# Patient Record
Sex: MALE | Race: WHITE | Employment: OTHER | URBAN - METROPOLITAN AREA
[De-identification: names, ages, dates, MRNs, and addresses within clinical notes are randomized per-mention and may not be internally consistent; named-entity substitution may affect disease eponyms.]

---

## 2017-01-04 DIAGNOSIS — Z96.659 S/P TOTAL KNEE ARTHROPLASTY: Primary | ICD-10-CM

## 2017-01-04 RX ORDER — AMOXICILLIN 500 MG/1
2000 CAPSULE ORAL ONCE
Qty: 12 CAPSULE | Refills: 0 | Status: SHIPPED | OUTPATIENT
Start: 2017-01-04 | End: 2017-01-04

## 2017-01-16 DIAGNOSIS — Z96.652 STATUS POST TOTAL LEFT KNEE REPLACEMENT: Primary | ICD-10-CM

## 2017-01-16 RX ORDER — AMOXICILLIN 500 MG/1
2000 TABLET, FILM COATED ORAL ONCE
Qty: 4 TABLET | Refills: 0 | Status: SHIPPED | OUTPATIENT
Start: 2017-01-16 | End: 2017-02-07

## 2017-01-16 NOTE — TELEPHONE ENCOUNTER
Shankar underwent left total knee replacement on 10/17/16.  Pt is calling to report dental issues, needing root canal and antibiotics beforehand.  Rx sent to his pharmacy, appt make in clinic for follow up with Maria Antonia in February.

## 2017-02-07 DIAGNOSIS — Z96.659 TOTAL KNEE REPLACEMENT STATUS: Primary | ICD-10-CM

## 2017-02-07 RX ORDER — AMOXICILLIN 500 MG/1
2000 TABLET, FILM COATED ORAL ONCE
Qty: 4 TABLET | Refills: 3 | Status: SHIPPED | OUTPATIENT
Start: 2017-02-07 | End: 2017-02-07

## 2017-02-07 RX ORDER — AMOXICILLIN 500 MG/1
CAPSULE ORAL
Qty: 12 CAPSULE | Refills: 1 | Status: SHIPPED | OUTPATIENT
Start: 2017-02-07 | End: 2017-10-20

## 2017-02-14 DIAGNOSIS — Z96.652 STATUS POST TOTAL LEFT KNEE REPLACEMENT: Primary | ICD-10-CM

## 2017-02-23 ENCOUNTER — OFFICE VISIT (OUTPATIENT)
Dept: ORTHOPEDICS | Facility: CLINIC | Age: 62
End: 2017-02-23

## 2017-02-23 DIAGNOSIS — Z96.652 STATUS POST TOTAL LEFT KNEE REPLACEMENT: Primary | ICD-10-CM

## 2017-02-23 ASSESSMENT — ENCOUNTER SYMPTOMS
ALTERED TEMPERATURE REGULATION: 0
NERVOUS/ANXIOUS: 0
DECREASED APPETITE: 0
WEIGHT LOSS: 0
POLYDIPSIA: 0
FATIGUE: 1
INSOMNIA: 0
DECREASED CONCENTRATION: 0
PANIC: 0
FEVER: 0
INCREASED ENERGY: 1
WEIGHT GAIN: 1
HALLUCINATIONS: 0
POLYPHAGIA: 0
DEPRESSION: 0
NIGHT SWEATS: 0
CHILLS: 0

## 2017-02-23 NOTE — LETTER
2/23/2017       RE: Shankar Armenta  [address] [facility] [address] [facility] [address]     Dear Colleague,    Thank you for referring your patient, Shankar Armenta, to the [facility] at [facility]. Please see a copy of my visit note below.    HISTORY OF PRESENT ILLNESS:  Shankar is seen nearly 4 months post of his left total knee arthroplasty.  He reports excellent improvement compared to his preoperative pain.  He has no new complaints, no night pain.  He denies swelling.  He is afebrile.  No new concerns, rates his pain a 0.  He takes nothing for pain on a regular basis.      PAST MEDICAL HISTORY:  Reviewed.      REVIEW OF SYSTEMS:  Reviewed.      PHYSICAL EXAMINATION:  This is a pleasant, cooperative male, alert and oriented x3, pleasant with the exam.  Walks with a normal gait pattern, arises from the seated position unguarded, has 0-128 degrees of motion today.  No swelling.  Incision is seen clean, healed, benign and intact.  Extensor mechanism is intact.  Quad strength is 5/5.  Alignment is near neutral.  No neurocirculatory changes are noted.  No pain to palpate the calf.      IMAGING:  X-rays were taken, which show good fit of the tibial and femoral components with ideal alignment.  Hemovac tib site is still intact and has remained unchanged compared to serial x-rays.      DIAGNOSIS:  Left total knee arthroplasty.      PLAN:  At this time, the natural progression and plan of care was discussed.  He will continue to advance activities, letting pain be his guide.  We did talk about prophylactic antibiotics for dental work, lifelong.  He will follow up in 1 year for x-rays or sooner if he has increasing pain, problems or other complications.         Again, thank you for allowing me to participate in the care of your patient.      Sincerely,    PRISCA Marroquin CNP

## 2017-02-23 NOTE — PROGRESS NOTES
HISTORY OF PRESENT ILLNESS:  Shankar is seen nearly 4 months post of his left total knee arthroplasty.  He reports excellent improvement compared to his preoperative pain.  He has no new complaints, no night pain.  He denies swelling.  He is afebrile.  No new concerns, rates his pain a 0.  He takes nothing for pain on a regular basis.      PAST MEDICAL HISTORY:  Reviewed.      REVIEW OF SYSTEMS:  Reviewed.      PHYSICAL EXAMINATION:  This is a pleasant, cooperative male, alert and oriented x3, pleasant with the exam.  Walks with a normal gait pattern, arises from the seated position unguarded, has 0-128 degrees of motion today.  No swelling.  Incision is seen clean, healed, benign and intact.  Extensor mechanism is intact.  Quad strength is 5/5.  Alignment is near neutral.  No neurocirculatory changes are noted.  No pain to palpate the calf.      IMAGING:  X-rays were taken, which show good fit of the tibial and femoral components with ideal alignment.  Hemovac tib site is still intact and has remained unchanged compared to serial x-rays.      DIAGNOSIS:  Left total knee arthroplasty.      PLAN:  At this time, the natural progression and plan of care was discussed.  He will continue to advance activities, letting pain be his guide.  We did talk about prophylactic antibiotics for dental work, lifelong.  He will follow up in 1 year for x-rays or sooner if he has increasing pain, problems or other complications.       Answers for HPI/ROS submitted by the patient on 2/23/2017   General Symptoms: Yes  Skin Symptoms: No  HENT Symptoms: No  EYE SYMPTOMS: No  HEART SYMPTOMS: No  LUNG SYMPTOMS: No  INTESTINAL SYMPTOMS: No  URINARY SYMPTOMS: No  REPRODUCTIVE SYMPTOMS: No  SKELETAL SYMPTOMS: No  BLOOD SYMPTOMS: No  NERVOUS SYSTEM SYMPTOMS: No  MENTAL HEALTH SYMPTOMS: Yes  Fever: No  Loss of appetite: No  Weight loss: No  Weight gain: Yes  Fatigue: Yes  Night sweats: No  Chills: No  Increased stress: Yes  Excessive hunger:  No  Excessive thirst: No  Feeling hot or cold when others believe the temperature is normal: No  Loss of height: No  Post-operative complications: No  Surgical site pain: No  Hallucinations: No  Change in or Loss of Energy: Yes  Hyperactivity: No  Confusion: No  Nervous or Anxious: No  Depression: No  Trouble sleeping: No  Trouble thinking or concentrating: No  Mood changes: No  Panic attacks: No

## 2017-02-23 NOTE — MR AVS SNAPSHOT
After Visit Summary   2017    Shankar Armenta    MRN: [id number]           Patient Information     Date Of Birth          1955        Visit Information        Provider Department      2017 10:00 AM Maria Antonia Velazuqez, APRN CNP [facility]        Today's Diagnoses     Status post total left knee replacement    -  1       Follow-ups after your visit        Who to contact     Please call your clinic at [phone] to:    Ask questions about your health    Make or cancel appointments    Discuss your medicines    Learn about your test results    Speak to your doctor   If you have compliments or concerns about an experience at your clinic, or if you wish to file a complaint, please contact [facility] Patient Relations at [phone] or email us at [email or web address]         Additional Information About Your Visit        MyChart Information     [employer] is an electronic gateway that provides easy, online access to your medical records. With [employer], you can request a clinic appointment, read your test results, renew a prescription or communicate with your care team.     To sign up for [employer] visit the website at [email or web address]   You will be asked to enter the access code listed below, as well as some personal information. Please follow the directions to create your username and password.     Your access code is: [email or web address]  Expires: 2017  6:31 AM     Your access code will  in 90 days. If you need help or a new code, please contact your [facility] or call [phone] for assistance.        Care EveryWhere ID     This is your Care EveryWhere ID. This could be used by other organizations to access your [place] medical records  [id number]         Blood Pressure from Last 3 Encounters:   No data found for BP    Weight from Last 3 Encounters:   16 91.6 kg (202 lb)               Today, you had the following     No orders found for display       Primary Care Provider Office Phone # Fax #    Joshua Herman [phone]       [address]  [place] [facility] [address]        Thank you!     Thank you for choosing [facility]  for your care. Our goal is always to provide you with excellent care. Hearing back from our patients is one way we can continue to improve our services. Please take a few minutes to complete the written survey that you may receive in the mail after your visit with us. Thank you!             Your Updated Medication List - Protect others around you: Learn how to safely use, store and throw away your medicines at www.disposemymeds.org.          This list is accurate as of: 2/23/17 11:59 PM.  Always use your most recent med list.                   Brand Name Dispense Instructions for use    amoxicillin 500 MG capsule    AMOXIL    12 capsule    TAKE 4 CAPSULES BY MOUTH X 1 DOSE 1 HOUR BEFORE DENTAL       aspirin 81 MG tablet      Take by mouth daily       ATENOLOL PO      Take 50 mg by mouth daily       CHLORTHALIDONE PO      Take 25 mg by mouth daily       IBUPROFEN PO      Take 400 mg by mouth every 8 hours as needed for moderate pain       OMEGA-3 FISH OIL PO          RANITIDINE HCL PO      Take 150 mg by mouth daily

## 2017-10-20 DIAGNOSIS — Z96.659 TOTAL KNEE REPLACEMENT STATUS: ICD-10-CM

## 2017-10-20 RX ORDER — AMOXICILLIN 500 MG/1
CAPSULE ORAL
Qty: 12 CAPSULE | Refills: 1 | Status: SHIPPED | OUTPATIENT
Start: 2017-10-20 | End: 2018-10-26

## 2018-10-26 DIAGNOSIS — Z96.659 TOTAL KNEE REPLACEMENT STATUS: ICD-10-CM

## 2018-10-26 RX ORDER — AMOXICILLIN 500 MG/1
CAPSULE ORAL
Qty: 12 CAPSULE | Refills: 1 | Status: SHIPPED | OUTPATIENT
Start: 2018-10-26

## 2019-01-23 ENCOUNTER — TRANSFERRED RECORDS (OUTPATIENT)
Dept: HEALTH INFORMATION MANAGEMENT | Facility: CLINIC | Age: 64
End: 2019-01-23

## 2019-01-30 ENCOUNTER — MEDICAL CORRESPONDENCE (OUTPATIENT)
Dept: HEALTH INFORMATION MANAGEMENT | Facility: CLINIC | Age: 64
End: 2019-01-30

## 2019-01-30 ENCOUNTER — TRANSFERRED RECORDS (OUTPATIENT)
Dept: HEALTH INFORMATION MANAGEMENT | Facility: CLINIC | Age: 64
End: 2019-01-30

## 2019-02-21 ENCOUNTER — TRANSFERRED RECORDS (OUTPATIENT)
Dept: HEALTH INFORMATION MANAGEMENT | Facility: CLINIC | Age: 64
End: 2019-02-21

## 2019-03-19 ENCOUNTER — DOCUMENTATION ONLY (OUTPATIENT)
Dept: CARE COORDINATION | Facility: CLINIC | Age: 64
End: 2019-03-19

## 2019-04-04 DIAGNOSIS — M25.561 RIGHT KNEE PAIN: Primary | ICD-10-CM

## 2019-04-15 ENCOUNTER — ANCILLARY PROCEDURE (OUTPATIENT)
Dept: GENERAL RADIOLOGY | Facility: CLINIC | Age: 64
End: 2019-04-15
Attending: ORTHOPAEDIC SURGERY
Payer: COMMERCIAL

## 2019-04-15 ENCOUNTER — HOSPITAL ENCOUNTER (OUTPATIENT)
Facility: CLINIC | Age: 64
End: 2019-04-15
Attending: ORTHOPAEDIC SURGERY | Admitting: ORTHOPAEDIC SURGERY
Payer: COMMERCIAL

## 2019-04-15 ENCOUNTER — OFFICE VISIT (OUTPATIENT)
Dept: ORTHOPEDICS | Facility: CLINIC | Age: 64
End: 2019-04-15
Payer: COMMERCIAL

## 2019-04-15 ENCOUNTER — DOCUMENTATION ONLY (OUTPATIENT)
Dept: ORTHOPEDICS | Facility: CLINIC | Age: 64
End: 2019-04-15

## 2019-04-15 VITALS — HEIGHT: 71 IN | WEIGHT: 208.6 LBS | BODY MASS INDEX: 29.2 KG/M2

## 2019-04-15 DIAGNOSIS — M17.12 PRIMARY LOCALIZED OSTEOARTHRITIS OF LEFT KNEE: Primary | ICD-10-CM

## 2019-04-15 DIAGNOSIS — M25.561 RIGHT KNEE PAIN: ICD-10-CM

## 2019-04-15 DIAGNOSIS — M17.11 PRIMARY OSTEOARTHRITIS OF RIGHT KNEE: ICD-10-CM

## 2019-04-15 RX ORDER — METOPROLOL SUCCINATE 25 MG/1
25 TABLET, EXTENDED RELEASE ORAL EVERY EVENING
COMMUNITY
Start: 2019-02-21

## 2019-04-15 RX ORDER — PRASUGREL 10 MG/1
10 TABLET, FILM COATED ORAL DAILY
COMMUNITY
Start: 2019-02-21 | End: 2021-03-02

## 2019-04-15 RX ORDER — ATORVASTATIN CALCIUM 40 MG/1
40 TABLET, FILM COATED ORAL EVERY MORNING
Refills: 3 | COMMUNITY
Start: 2019-02-23

## 2019-04-15 RX ORDER — ACETAMINOPHEN 500 MG
500 TABLET ORAL EVERY 6 HOURS PRN
COMMUNITY
Start: 2016-10-17 | End: 2021-03-02

## 2019-04-15 ASSESSMENT — MIFFLIN-ST. JEOR: SCORE: 1756.2

## 2019-04-15 NOTE — LETTER
4/15/2019       RE: Shankar Armenta  [address] [facility] [address]     Dear Colleague,    Thank you for referring your patient, Shankar Armenta, to the [facility] at [facility]. Please see a copy of my visit note below.      HISTORY  Shankar is a 63 year old gentleman who presents with the problem of right knee pain. He has previously undergone left total knee arthroplasty by Dr Hua a number of years ago.     The right knee has been slowly becoming more sore for a long period of time.  He wears a neoprene knee brace, which provides some relief.  Without the brace he can only walk for 5-10 minutes before having to stop.  There is occasional pain at night.  He is not taking any analgesia currently.  The knee catches and clicks, and sometimes gives way.    The left knee is going well, apart from the occasional nonpainful click.    Shankar lives with his wife.    REVIEW OF SYSTEMS / PAST HISTORY  Shankar underwent a cardiac stent in January 2019.  He is on anticoagulant medication.  He has been advised, informally by his cardiologist, not to undergo a knee arthroplasty until around the end of the 2019.      EXAMINATION  The patient presents alert and oriented with normal affect.  Circulatory status of the limb is normal. Neurological examination of the affected limb reveals no abnormalities.     Shankar has neutral alignment to his right knee.  There is medial joint line tenderness as well as lateral joint line tenderness.  The knee is stable.  There is a range of 5 degrees of fixed flexion to 125 degrees.    IMAGING   Radiographs demonstrate tricompartmental osteoarthritis bone-on-bone in nature.    ASSESSMENT  Shankar has right knee tricompartmental osteoarthritis.  The surgical intervention most appropriate to assist him would be a total knee arthroplasty, however we aware that he can undergo this until some time is a lot since his cardiac stent.    The patient has been seen  and examined by Dr NANCY Hua who is in agreement with the above plan.     - Dr. El Rodriguez (Orthopaedic Fellow)     I, Dr. Shankar Hua, agree with above plan of care and examination.       Again, thank you for allowing me to participate in the care of your patient.      Sincerely,    Shankar Hua MD

## 2019-04-15 NOTE — PROGRESS NOTES
Patient is scheduled for surgery with Dr. Hua    Spoke or left message with: Patient in exam room    Date of Surgery: 10/24/19    Location: [place]    Post ops: 2 weeks & 6 weeks    Pre-op with surgeon (if applicable): Complete    H&P: Patient will call to schedule    Additional imaging/appointments: N/A    Surgery packet: Received in clinic    Additional comments: N/A

## 2019-04-15 NOTE — NURSING NOTE
"Reason For Visit:   Chief Complaint   Patient presents with     RECHECK     Consult right TKA       Ht 1.8 m (5' 10.87\")   Wt 94.6 kg (208 lb 9.6 oz)   BMI 29.20 kg/m      Pain Assessment  Patient Currently in Pain: Yes  0-10 Pain Scale: 6  Primary Pain Location: Knee    Bianka Ryan ATC    "

## 2019-04-15 NOTE — PROGRESS NOTES
HISTORY  Shankar is a 63 year old gentleman who presents with the problem of right knee pain. He has previously undergone left total knee arthroplasty by Dr Hua a number of years ago.     The right knee has been slowly becoming more sore for a long period of time.  He wears a neoprene knee brace, which provides some relief.  Without the brace he can only walk for 5-10 minutes before having to stop.  There is occasional pain at night.  He is not taking any analgesia currently.  The knee catches and clicks, and sometimes gives way.    The left knee is going well, apart from the occasional nonpainful click.    Shankar lives with his wife.    REVIEW OF SYSTEMS / PAST HISTORY  Shankar underwent a cardiac stent in January 2019.  He is on anticoagulant medication.  He has been advised, informally by his cardiologist, not to undergo a knee arthroplasty until around the end of the 2019.      EXAMINATION  The patient presents alert and oriented with normal affect.  Circulatory status of the limb is normal. Neurological examination of the affected limb reveals no abnormalities.     Shankar has neutral alignment to his right knee.  There is medial joint line tenderness as well as lateral joint line tenderness.  The knee is stable.  There is a range of 5 degrees of fixed flexion to 125 degrees.    IMAGING   Radiographs demonstrate tricompartmental osteoarthritis bone-on-bone in nature.    ASSESSMENT  Shankar has right knee tricompartmental osteoarthritis.  The surgical intervention most appropriate to assist him would be a total knee arthroplasty, however we aware that he can undergo this until some time is a lot since his cardiac stent.    The patient has been seen and examined by Dr NANCY Hua who is in agreement with the above plan.     - Dr. El Rodriguez (Orthopaedic Fellow)     I, Dr. Shankar Hua, agree with above plan of care and examination.

## 2019-04-15 NOTE — NURSING NOTE
Teaching Flowsheet   Relevant Diagnosis: osteoarthritis knee  Teaching Topic: preop right TKA    Shankar lives in [place] with his wife, works as supervisor at a desk.  He underwent LTKA 3 yrs ago at [facility] with Dr Hua.  Pt is on blood thinner for coronary stent placed January 2019.  Pt states he will review the joint class online     Person(s) involved in teaching:   Patient     Motivation Level:  Asks Questions: Yes  Eager to Learn: Yes  Cooperative: Yes  Receptive (willing/able to accept information): Yes  Any cultural factors/Quaker beliefs that may influence understanding or compliance? No  Comments:      Patient demonstrates understanding of the following:  Reason for the appointment, diagnosis and treatment plan: Yes  Knowledge of proper use of medications and conditions for which they are ordered (with special attention to potential side effects or drug interactions): Yes  Which situations necessitate calling provider and whom to contact: Yes     Teaching Concerns Addressed:   Comments:      Proper use and care of CPM (medical equip, care aids, etc.): Yes  Nutritional needs and diet plan: Yes  Pain management techniques: Yes  Wound Care: Yes  How and/when to access community resources: NA     Instructional Materials Used/Given: preop packet, Total joint book, total joint class trifold, antiseptic soap,  antibiotics before dental reminder card,    Time spent with patient: 15 minutes.

## 2019-09-20 ENCOUNTER — TELEPHONE (OUTPATIENT)
Dept: ORTHOPEDICS | Facility: CLINIC | Age: 64
End: 2019-09-20

## 2019-09-20 NOTE — TELEPHONE ENCOUNTER
I called the patient back this morning regarding his medication questions with him upcoming procedure. I let him know that Dr. Ramirez nurses are out today and will have to get back to him next week with the answers to these questions. I will forward to Maria Antonia Velazquez.    Bianka Ryan, ATC

## 2019-09-20 NOTE — TELEPHONE ENCOUNTER
[facility] Call Center    Phone Message    May a detailed message be left on voicemail: yes    Reason for Call: patient called wanting to know since his surgery is set for Oct 24, when does he need to pause his blood thinners (prasugrel (EFFIENT) 10 MG TABS tablet & aspirin 81 MG tablet)     Also he is wanting to get a shingrix vaccine and he wanted to know if tat would interfere with the surgery?     Please advise  Action Taken: Message routed to:  [facility] ORTHO

## 2019-10-15 ENCOUNTER — TELEPHONE (OUTPATIENT)
Dept: ORTHOPEDICS | Facility: CLINIC | Age: 64
End: 2019-10-15

## 2019-10-15 NOTE — TELEPHONE ENCOUNTER
Received call from patient wishing to confirm the arrival time and address of his surgery with Dr. Hua on 10/24/19.     Patient also requested that the anesthesia record from his last surgery with Dr. Hua at [facility] (10/17/16) be reviewed, as he became very ill with the anesthetic/medication that was used. Patient would like to know what his alternative option is.

## 2019-10-17 ENCOUNTER — TELEPHONE (OUTPATIENT)
Dept: ORTHOPEDICS | Facility: CLINIC | Age: 64
End: 2019-10-17

## 2019-10-17 NOTE — TELEPHONE ENCOUNTER
[facility] Call [facility]    Phone Message    May a detailed message be left on voicemail: yes    Reason for Call: Other: Pt of Dr Hua has Post Surgery questions - will he have access to a CPM (continous passive motion) machine for Knee Rehab that he can use after surgery for at home? - Please return his call either way - Thanks     Action Taken: Message routed to:  [facility] Ortho

## 2019-10-17 NOTE — TELEPHONE ENCOUNTER
Shankar was phoned back regarding his questions about anesthesia and if CPM will be used after Total knee replacement.    Pt was told CPM is part of Dr Hua's TKA protocol.  We have fentanyl on pt's allergy list, and stated anesthesia will discuss with him and review his previous experiences with anesthesia.  We reviewed [facility] states he had nausea and vomiting.  Shyann Freeman RN

## 2019-10-22 ENCOUNTER — TRANSFERRED RECORDS (OUTPATIENT)
Dept: HEALTH INFORMATION MANAGEMENT | Facility: CLINIC | Age: 64
End: 2019-10-22

## 2019-10-22 RX ORDER — METFORMIN HCL 500 MG
500 TABLET, EXTENDED RELEASE 24 HR ORAL 2 TIMES DAILY WITH MEALS
COMMUNITY
End: 2019-10-24 | Stop reason: HOSPADM

## 2019-10-22 RX ORDER — SILDENAFIL 100 MG/1
100 TABLET, FILM COATED ORAL DAILY PRN
COMMUNITY
End: 2019-10-24 | Stop reason: HOSPADM

## 2019-10-23 ENCOUNTER — ANESTHESIA EVENT (OUTPATIENT)
Dept: SURGERY | Facility: CLINIC | Age: 64
End: 2019-10-23

## 2019-10-23 RX ORDER — FLUMAZENIL 0.1 MG/ML
0.2 INJECTION, SOLUTION INTRAVENOUS
Status: CANCELLED | OUTPATIENT
Start: 2019-10-23

## 2019-10-23 RX ORDER — FENTANYL CITRATE 50 UG/ML
25-50 INJECTION, SOLUTION INTRAMUSCULAR; INTRAVENOUS
Status: CANCELLED | OUTPATIENT
Start: 2019-10-23

## 2019-10-23 RX ORDER — CELECOXIB 200 MG/1
200 CAPSULE ORAL ONCE
Status: CANCELLED | OUTPATIENT
Start: 2019-10-23

## 2019-10-23 RX ORDER — ACETAMINOPHEN 325 MG/1
975 TABLET ORAL ONCE
Status: CANCELLED | OUTPATIENT
Start: 2019-10-23

## 2019-10-23 RX ORDER — CEFAZOLIN SODIUM 2 G/100ML
2 INJECTION, SOLUTION INTRAVENOUS
Status: CANCELLED | OUTPATIENT
Start: 2019-10-23

## 2019-10-23 RX ORDER — NALOXONE HYDROCHLORIDE 0.4 MG/ML
.1-.4 INJECTION, SOLUTION INTRAMUSCULAR; INTRAVENOUS; SUBCUTANEOUS
Status: CANCELLED | OUTPATIENT
Start: 2019-10-23

## 2019-10-23 RX ORDER — CEFAZOLIN SODIUM 1 G/3ML
1 INJECTION, POWDER, FOR SOLUTION INTRAMUSCULAR; INTRAVENOUS SEE ADMIN INSTRUCTIONS
Status: CANCELLED | OUTPATIENT
Start: 2019-10-23

## 2019-10-23 RX ORDER — GABAPENTIN 100 MG/1
100 CAPSULE ORAL
Status: CANCELLED | OUTPATIENT
Start: 2019-10-23

## 2019-10-23 NOTE — OR NURSING
Sent text message to Dr. Hua at 1745 to have him call [facility] re: patient, Shankar Armenta for OR on 10/24/19. Called Dr. Hua and left VM to call [facility] at 1701 after no response via pager.  Contacted SHAHLA Figueroa via cell phone and explained that I received a fax message stating that is was not safe to hold dual antiplatelet  Therapy and that patient should be on at least 12 months following his drug eluting stent placement. Maria Antonia stated that she would contact Dr. Hua.  At 1726, SHAHLA Figueroa called back and stated that she was not able to get a hold of Dr. Hua but would continue to try and reach him and also call the patient with the outcome of Dr. Hua's decision.

## 2019-10-23 NOTE — TELEPHONE ENCOUNTER
[facility] Call [facility]    Phone Message    May a detailed message be left on voicemail: yes    Reason for Call: Other: Pt would like to speak to report which medications he will be stopping.   If any questions please call pt on his cell phone, [phone].    prasugrel (EFFIENT) 10 MG TABS tablet  aspirin 81 MG tablet    Action Taken: Message routed to:  [facility] Orthopedics

## 2019-10-24 ENCOUNTER — ANESTHESIA (OUTPATIENT)
Dept: SURGERY | Facility: CLINIC | Age: 64
End: 2019-10-24

## 2019-10-24 RX ORDER — DIMENHYDRINATE 50 MG/ML
25 INJECTION, SOLUTION INTRAMUSCULAR; INTRAVENOUS
Status: CANCELLED | OUTPATIENT
Start: 2019-10-24

## 2019-10-24 RX ORDER — ONDANSETRON 4 MG/1
4 TABLET, ORALLY DISINTEGRATING ORAL EVERY 30 MIN PRN
Status: CANCELLED | OUTPATIENT
Start: 2019-10-24

## 2019-10-24 RX ORDER — HYDRALAZINE HYDROCHLORIDE 20 MG/ML
2.5-5 INJECTION INTRAMUSCULAR; INTRAVENOUS EVERY 10 MIN PRN
Status: CANCELLED | OUTPATIENT
Start: 2019-10-24

## 2019-10-24 RX ORDER — FENTANYL CITRATE 50 UG/ML
25-50 INJECTION, SOLUTION INTRAMUSCULAR; INTRAVENOUS
Status: CANCELLED | OUTPATIENT
Start: 2019-10-24

## 2019-10-24 RX ORDER — MEPERIDINE HYDROCHLORIDE 25 MG/ML
12.5 INJECTION INTRAMUSCULAR; INTRAVENOUS; SUBCUTANEOUS
Status: CANCELLED | OUTPATIENT
Start: 2019-10-24

## 2019-10-24 RX ORDER — HYDROMORPHONE HYDROCHLORIDE 1 MG/ML
.3-.5 INJECTION, SOLUTION INTRAMUSCULAR; INTRAVENOUS; SUBCUTANEOUS EVERY 10 MIN PRN
Status: CANCELLED | OUTPATIENT
Start: 2019-10-24

## 2019-10-24 RX ORDER — METOPROLOL TARTRATE 1 MG/ML
1-2 INJECTION, SOLUTION INTRAVENOUS EVERY 5 MIN PRN
Status: CANCELLED | OUTPATIENT
Start: 2019-10-24

## 2019-10-24 RX ORDER — SODIUM CHLORIDE, SODIUM LACTATE, POTASSIUM CHLORIDE, CALCIUM CHLORIDE 600; 310; 30; 20 MG/100ML; MG/100ML; MG/100ML; MG/100ML
INJECTION, SOLUTION INTRAVENOUS CONTINUOUS
Status: CANCELLED | OUTPATIENT
Start: 2019-10-24

## 2019-10-24 RX ORDER — ALBUTEROL SULFATE 0.83 MG/ML
2.5 SOLUTION RESPIRATORY (INHALATION) EVERY 4 HOURS PRN
Status: CANCELLED | OUTPATIENT
Start: 2019-10-24

## 2019-10-24 RX ORDER — ONDANSETRON 2 MG/ML
4 INJECTION INTRAMUSCULAR; INTRAVENOUS EVERY 30 MIN PRN
Status: CANCELLED | OUTPATIENT
Start: 2019-10-24

## 2019-10-24 RX ORDER — NALOXONE HYDROCHLORIDE 0.4 MG/ML
.1-.4 INJECTION, SOLUTION INTRAMUSCULAR; INTRAVENOUS; SUBCUTANEOUS
Status: CANCELLED | OUTPATIENT
Start: 2019-10-24 | End: 2019-10-25

## 2019-10-24 NOTE — ANESTHESIA PREPROCEDURE EVALUATION
Anesthesia Pre-Procedure Evaluation    Patient: Shankar Armenta   MRN:     [id number] Gender:   male   Age:    64 year old :      1955        Preoperative Diagnosis: Primary Osteoarthritis Of Right Knee   Procedure(s):  Right Total Knee Arthroplasty     Past Medical History:   Diagnosis Date     Arthritis     osteoarthritis     Coronary artery disease      Diabetes (H)      Hypertension      PONV (postoperative nausea and vomiting)      Stented coronary artery 2019      Past Surgical History:   Procedure Laterality Date     ARTHROPLASTY KNEE Left 2016     BIOPSY      cecum          Anesthesia Evaluation     . Pt has had prior anesthetic. Type: General and Regional    History of anesthetic complications   - PONV        ROS/MED HX    ENT/Pulmonary:  - neg pulmonary ROS     Neurologic:  - neg neurologic ROS     Cardiovascular:     (+) hypertension--CAD, --stent,19  cfx  1 Drug Eluting Stent .. : . . . :. . Previous cardiac testing Echodate:19results:  * The left ventricular systolic function is normal, estimated LVEF 60-65%.    * Asymmetric septal hypertrophy likely representing sigmoid septum (septum  1.5 cm, posterior wall 0.96 cm).    * Abnormal septal motion secondary to conduction abnormality.    * Chordal KB present (no LVOT obstruction, peak gradient 5 mmHg).date: results:ECG reviewed date:10/8/19 results:SB 57 +/- incomplete RBBBCath date: 19 results:Diagnostic Summary    * LM has 0% stenosis.    * pLAD: Minimal luminal irregularities, NAINA: 3 flow.    * 1st Diag: Significant 90% stenosis, NAINA: 3 flow.    * dCIRC: Significant 99% stenosis, NAINA: 2 flow.    * pRCA to mRCA: 30% stenosis, NAINA: 3 flow.    * Coronary angiography shows right dominance.      Interventional Summary    * dCIRC: 99% stenosis treated with BSCI Emerge 1.5x12 Balloon, BSCI Emerge  2.0x12 Balloon, and RESOLUTE 2.0x15 Drug Eluting Stent. 0% residual stenosis,  NAINA: 3 flow.          METS/Exercise Tolerance:    "  Hematologic:  - neg hematologic  ROS       Musculoskeletal:   (+) arthritis,  -       GI/Hepatic:     (+) GERD       Renal/Genitourinary:  - ROS Renal section negative       Endo:     (+) type II DM Last HgA1c: 7.3 date: 10/9/19 .      Psychiatric:  - neg psychiatric ROS       Infectious Disease:  - neg infectious disease ROS       Malignancy:      - no malignancy   Other:    - neg other ROS                 JZG FV AN PHYSICAL EXAM    LABS:  CBC: No results found for: WBC, HGB, HCT, PLT  BMP: No results found for: NA, POTASSIUM, CHLORIDE, CO2, BUN, CR, GLC  COAGS: No results found for: PTT, INR, FIBR  POC: No results found for: BGM, HCG, HCGS  OTHER: No results found for: PH, LACT, A1C, ANABELA, PHOS, MAG, ALBUMIN, PROTTOTAL, ALT, AST, GGT, ALKPHOS, BILITOTAL, BILIDIRECT, LIPASE, AMYLASE, CHELA, TSH, T4, T3, CRP, SED     Preop Vitals    BP Readings from Last 3 Encounters:   No data found for BP    Pulse Readings from Last 3 Encounters:   No data found for Pulse      Resp Readings from Last 3 Encounters:   No data found for Resp    SpO2 Readings from Last 3 Encounters:   No data found for SpO2      Temp Readings from Last 1 Encounters:   No data found for Temp    Ht Readings from Last 1 Encounters:   04/15/19 1.8 m (5' 10.87\")      Wt Readings from Last 1 Encounters:   04/15/19 94.6 kg (208 lb 9.6 oz)    Estimated body mass index is 29.2 kg/m  as calculated from the following:    Height as of 4/15/19: 1.8 m (5' 10.87\").    Weight as of 4/15/19: 94.6 kg (208 lb 9.6 oz).     LDA:        Assessment:   ASA SCORE: 3    H&P: History and physical reviewed and following examination; no interval change.   Smoking Status:  Non-Smoker/Unknown   NPO Status: NPO Appropriate     Plan:   Anes. Type:  Spinal; MAC; Peripheral Nerve Block   Pre-Medication: Midazolam; Acetaminophen; Gabapentin   Induction:  N/a   Airway: Native Airway   Access/Monitoring: PIV        Postop Plan:   Postop Pain: Regional  Postop Sedation/Airway: Not " planned  Disposition: Outpatient     PONV Management:   Adult Risk Factors:, H/o PONV or Motion Sickness, Non-Smoker   Prevention: Ondansetron, Dexamethasone, Other                   Gretchen Gomes MD

## 2020-03-05 DIAGNOSIS — M17.11 PRIMARY OSTEOARTHRITIS OF RIGHT KNEE: Primary | ICD-10-CM

## 2020-03-23 ENCOUNTER — TELEPHONE (OUTPATIENT)
Dept: ORTHOPEDICS | Facility: CLINIC | Age: 65
End: 2020-03-23

## 2020-03-23 NOTE — TELEPHONE ENCOUNTER
Called Shankar today and left a message in regards to his upcoming appointment on 3/30/2020. I told patient we would cancel his upcoming appointment and I recommended he call us back and let us know how his TKA is feeling and if he has any further questions or emergent concerns he should call us and be seen.    Otherwise, his follow up can be PRN or he can reschedule in another 2 months if needed.    Please remind patient of lifelong antibiotics for all dental procedures.

## 2020-06-11 NOTE — TELEPHONE ENCOUNTER
DIAGNOSIS: Discuss total knee replacement, right knee per pt    APPOINTMENT DATE: 8/3/2020   NOTES STATUS DETAILS   OFFICE NOTE from referring provider N/A    OFFICE NOTE from other specialist Internal / Care Everywhere  4/15/19 OV with Dr Hua (ortho)    TRIA recs   10/19/16 therapy note with Emily Torre DPT    DISCHARGE SUMMARY from hospital N/A    DISCHARGE REPORT from the ER N/A    OPERATIVE REPORT Care Everywhere 10/17/2016 LEFT total knee replacement (TRIA )   MEDICATION LIST Care Everywhere    EMG (for Spine) N/A    IMPLANT RECORD/STICKER N/A    LABS     CBC/DIFF Care Everywhere 1/24/19   CULTURES N/A    INJECTIONS DONE IN RADIOLOGY N/A    MRI N/A    CT SCAN N/A    XRAYS (IMAGES & REPORTS) Internal 4/15/19 XR Knee    TUMOR     PATHOLOGY  Slides & report N/A

## 2020-08-03 ENCOUNTER — PREP FOR PROCEDURE (OUTPATIENT)
Dept: ORTHOPEDICS | Facility: CLINIC | Age: 65
End: 2020-08-03

## 2020-08-03 ENCOUNTER — VIRTUAL VISIT (OUTPATIENT)
Dept: ORTHOPEDICS | Facility: CLINIC | Age: 65
End: 2020-08-03
Payer: COMMERCIAL

## 2020-08-03 ENCOUNTER — PRE VISIT (OUTPATIENT)
Dept: ORTHOPEDICS | Facility: CLINIC | Age: 65
End: 2020-08-03

## 2020-08-03 DIAGNOSIS — M17.11 PRIMARY OSTEOARTHRITIS OF RIGHT KNEE: Primary | ICD-10-CM

## 2020-08-03 NOTE — PROGRESS NOTES
"Shankar Armenta is a 64 year old male who is being evaluated via a billable telephone visit.      The patient has been notified of following:     \"This telephone visit will be conducted via a call between you and your physician/provider. We have found that certain health care needs can be provided without the need for a physical exam.  This service lets us provide the care you need with a short phone conversation.  If a prescription is necessary we can send it directly to your pharmacy.  If lab work is needed we can place an order for that and you can then stop by our lab to have the test done at a later time.    Telephone visits are billed at different rates depending on your insurance coverage. During this emergency period, for some insurers they may be billed the same as an in-person visit.  Please reach out to your insurance provider with any questions.    If during the course of the call the physician/provider feels a telephone visit is not appropriate, you will not be charged for this service.\"    Patient has given verbal consent for Telephone visit?  Yes    What phone number would you like to be contacted at? [phone]    How would you like to obtain your AVS? Mail a copy    Phone call duration: 11 minutes    Dr. Hua called Shankar to discuss continued worsening right knee pain. AT this time, Shankar is feeling well and has been off blood thinners since January and doing well cardiac wise and has been cleared for his R TKA. We will contact him to schedule this ASAP.    Dr. Shankar Hua      I, Dr. Shankar Hua, agree with above telephone documentation.  "

## 2020-08-03 NOTE — LETTER
"    8/3/2020         RE: Shankar Armenta  [address] [facility] [address]        Dear Colleague,    Thank you for referring your patient, Shankar Armenta, to the [facility]. Please see a copy of my visit note below.    Shankar Armenta is a 64 year old male who is being evaluated via a billable telephone visit.      The patient has been notified of following:     \"This telephone visit will be conducted via a call between you and your physician/provider. We have found that certain health care needs can be provided without the need for a physical exam.  This service lets us provide the care you need with a short phone conversation.  If a prescription is necessary we can send it directly to your pharmacy.  If lab work is needed we can place an order for that and you can then stop by our lab to have the test done at a later time.    Telephone visits are billed at different rates depending on your insurance coverage. During this emergency period, for some insurers they may be billed the same as an in-person visit.  Please reach out to your insurance provider with any questions.    If during the course of the call the physician/provider feels a telephone visit is not appropriate, you will not be charged for this service.\"    Patient has given verbal consent for Telephone visit?  Yes    What phone number would you like to be contacted at? [phone]    How would you like to obtain your AVS? Mail a copy    Phone call duration: 11 minutes    Dr. Hua called Shankar to discuss continued worsening right knee pain. AT this time, Shankar is feeling well and has been off blood thinners since January and doing well cardiac wise and has been cleared for his R TKA. We will contact him to schedule this ASAP.    Dr. Shankar Hua      I, Dr. Shankar Hua, agree with above telephone documentation.        "

## 2020-08-13 ENCOUNTER — TELEPHONE (OUTPATIENT)
Dept: ORTHOPEDICS | Facility: CLINIC | Age: 65
End: 2020-08-13

## 2020-08-13 NOTE — TELEPHONE ENCOUNTER
Phoned patient to confirm a surgery date with Dr Hua. Patient was offered 9/10, 9/17, or a date in December. I left him my direct number to [facility] back when he is able. [phone]

## 2020-08-17 NOTE — TELEPHONE ENCOUNTER
Received call back from patient, requesting a date in late October for surgery. Patient was told that there isn't anything available at that time currently, but he will be contacted if something opens up. Patient was agreeable and will wait for a call.

## 2020-08-25 NOTE — TELEPHONE ENCOUNTER
Phoned patient to provide update on scheduling availability for Dr Hua. Patient had requested the last week of October, but currently only has 10/22 available. I left Shankar my direct number to call back to either confirm the 22nd or wait to see if something else opens up.

## 2020-08-26 ENCOUNTER — HOSPITAL ENCOUNTER (OUTPATIENT)
Facility: CLINIC | Age: 65
End: 2020-08-26
Attending: ORTHOPAEDIC SURGERY | Admitting: ORTHOPAEDIC SURGERY
Payer: COMMERCIAL

## 2020-08-26 DIAGNOSIS — Z11.59 ENCOUNTER FOR SCREENING FOR OTHER VIRAL DISEASES: Primary | ICD-10-CM

## 2020-08-26 DIAGNOSIS — M17.11 PRIMARY OSTEOARTHRITIS OF RIGHT KNEE: ICD-10-CM

## 2020-08-26 NOTE — TELEPHONE ENCOUNTER
Received call back from patient, wishing to confirm 10/22/20 as a surgery date with Dr Hua for his Right total knee arthroplasty.

## 2020-10-02 DIAGNOSIS — M17.11 PRIMARY OSTEOARTHRITIS OF RIGHT KNEE: Primary | ICD-10-CM

## 2020-10-05 RX ORDER — CELECOXIB 200 MG/1
400 CAPSULE ORAL ONCE
Status: CANCELLED | OUTPATIENT
Start: 2020-10-05

## 2020-10-05 RX ORDER — ACETAMINOPHEN 325 MG/1
975 TABLET ORAL ONCE
Status: CANCELLED | OUTPATIENT
Start: 2020-10-05

## 2020-10-05 RX ORDER — CEFAZOLIN SODIUM 2 G/100ML
2 INJECTION, SOLUTION INTRAVENOUS
Status: CANCELLED | OUTPATIENT
Start: 2020-10-05

## 2020-10-05 RX ORDER — CEFAZOLIN SODIUM 1 G/3ML
1 INJECTION, POWDER, FOR SOLUTION INTRAMUSCULAR; INTRAVENOUS SEE ADMIN INSTRUCTIONS
Status: CANCELLED | OUTPATIENT
Start: 2020-10-05

## 2020-10-14 NOTE — TELEPHONE ENCOUNTER
Received voicemail from patient requesting to cancel surgery on 10/22/20. Patient stated in voicemail that he would like to post pone to next year. Attempted to return phone call to patient to confirm the cancellation. Left detailed message informing patient that we have received his request and when he is ready to reschedule we can be reached at [phone]

## 2021-01-13 ENCOUNTER — TELEPHONE (OUTPATIENT)
Dept: ORTHOPEDICS | Facility: CLINIC | Age: 66
End: 2021-01-13

## 2021-01-13 NOTE — TELEPHONE ENCOUNTER
I called the patient back this afternoon to discuss. I had a nice conversation with him regarding who he should see now that DD is leaving. I told him that we would refer him to either Dr. Davis or Dr. Marcial. I did mention to the patient if he would like to have his replacement done sooner rather than later that he should look into an appointment with Dr. Marcial. I offered to help him make an appointment while we were on the phone, he declined as he would like to do some research first. He does have a prior auth approved for a knee replacement through April. I did tell him with this knowledge he should try and get in as soon as he can so that he can get on the surgery schedule once he finds a surgeon he is comfortable with. He will do his research and call back to schedule when he decides.    Bianka

## 2021-01-13 NOTE — TELEPHONE ENCOUNTER
[facility] Call Center    Phone Message    May a detailed message be left on voicemail: yes     Reason for Call: Other: Patient is wanting to schedule a f/u with Dr. Hua before he leaves the clinic. Patient would like to discuss plan of care and who he could continue his services with upon Dr. Ramirez departure. Please call patient back to discuss further.     Action Taken: Message routed to:  [facility] ortho    Travel Screening: Not Applicable

## 2021-01-14 NOTE — TELEPHONE ENCOUNTER
RECORDS RECEIVED FROM: Discuss total knee replacement, right knee per pt/Dr. Hua/HP   DATE RECEIVED: Feb 10, 2021     NOTES STATUS DETAILS   OFFICE NOTE from referring provider Internal    OFFICE NOTE from other specialist N/A    DISCHARGE SUMMARY from hospital N/A    DISCHARGE REPORT from the ER N/A    OPERATIVE REPORT N/A    MEDICATION LIST Internal    IMPLANT RECORD/STICKER N/A    LABS     CBC/DIFF N/A    CULTURES N/A    INJECTIONS DONE IN RADIOLOGY N/A    MRI N/A    CT SCAN N/A    XRAYS (IMAGES & REPORTS) Internal    TUMOR     PATHOLOGY  Slides & report N/A    01/14/21   11:46 AM   Complete  Faith Tripathi CMA

## 2021-01-15 ENCOUNTER — HEALTH MAINTENANCE LETTER (OUTPATIENT)
Age: 66
End: 2021-01-15

## 2021-02-07 ASSESSMENT — ENCOUNTER SYMPTOMS
BACK PAIN: 0
JOINT SWELLING: 0
MUSCLE CRAMPS: 0
MUSCLE WEAKNESS: 0
NECK PAIN: 0
STIFFNESS: 0
ARTHRALGIAS: 1
MYALGIAS: 0

## 2021-02-07 ASSESSMENT — ACTIVITIES OF DAILY LIVING (ADL): FUNCTION,_DAILY_LIVING_SCORE: 92.64

## 2021-02-09 DIAGNOSIS — M17.11 PRIMARY OSTEOARTHRITIS OF RIGHT KNEE: Primary | ICD-10-CM

## 2021-02-09 NOTE — PROGRESS NOTES
[facility]  Orthopaedic Surgery Consultation by Jewel Marcial M.D.    Shankar Armenta MRN# [id number]   Age: 65 year old YOB: 1955     Requesting physician: No ref. provider found  Joshua Herman     Background history:  DX:  1. Bilateral knee osteoarthritis  2. Diabetes mellitus  3. Hypertension  4. Coronary artery disease and PTCA on aspirin.    TREATMENTS:  10/17/2016, left total knee arthroplasty, , Junction triathlon femur size 5 tibia size 5, 32 mm asymmetric patella, 13 mm PS polyethylene liner.          History of Present Illness:   65 year old male who presents because of chronic and limiting pains of right knee.  Patient underwent a left total knee arthroplasty in 2016 for similar pains and has been experiencing great relief.  Over the past years his right knee started to progressively bother him.  He describes the presence of occasional night pain.  He actively prevents sitting down in one position for longer periods of time so is not endorsing initiation pain and stiffness.  He has seen a physical therapist in the past.  He wears a brace for comfort. He has not had injection therapy nor does use over-the-counter analgesics in a regular fashion.    After previous general anesthesia patient has been very nauseous waking up.  He would like to talk to an anesthesiologist in order to mitigate these complaints.    Current symptoms:  Problem: Right knee pain  Onset and duration: Multiple years  Awakens from sleep due to sx's:  No  Precipitating Injury:  No    Other joints or sites painful:  No    Social:   Occupation:   Living situation: Was together with spouse.  Has to walk stairs.  Hobbies / Sports:     Smoking: No  Alcohol: No  Illicit drug use: No         Physical Exam:     EXAMINATION pertinent findings:   PSYCH: Pleasant, healthy-appearing, alert, oriented x3, cooperative. Normal mood and affect.  VITAL SIGNS: There were no vitals taken for this visit..  Reviewed nursing  intake notes.   There is no height or weight on file to calculate BMI.  RESP: non labored breathing   ABD: benign, soft, non-tender, no acute peritoneal findings  SKIN: grossly normal   LYMPHATIC: grossly normal, no adenopathy, no extremity edema  NEURO: grossly normal , no motor deficits  VASCULAR: satisfactory perfusion of all extremities   MUSCULOSKELETAL:   Alignment: Varus alignment  Gait: Slightly antalgic over right lower extremity.    The right hip exhibits a full range of motion.  Rotations are nonpainful.  Lasegue's test is negative.    R knee: -5-0  .  Deep flexion is painful.  Straight leg raise +. No redness, warmth or skin changes present. Effusion minimal. Ligamentously stable in both ML and AP direction.  There is +1 laxity most likely due to substance loss.  Normal PF tracking with some crepitus.  Meniscal provocation tests are sensitive.  There is some tenderness to palpation over the medial joint line.     Right LE:   Thigh and leg compartments soft and compressible   +Quad/TA/GSC/FHL/EHL   SILT DP/SP/Trell/Saph/Tib nerve distributions   Palpable dorsalis pedis pulse              Data:   All laboratory data reviewed  All imaging studies reviewed by me personally.    XR knee right 4/15/2019 and 2/10/2021:  Significant osteoarthritic changes of the right knee most notably in the medial compartment which exhibits bone-on-bone arthritis under 2019 standing AP image.  There is also the presence of marginal osteophytes in the lateral compartment and patellofemoral joint where the articular joint space height is relatively well preserved.            Assessment and Plan:   Assessment:  65-year-old male with chronic right knee pain most likely caused by the medial compartmental end-stage osteoarthritic changes.     Plan:  I had a long discussion with the patient regarding ongoing management options.  Reviewed surgical and nonsurgical treatments.  The non-surgical options include activity modification,  weight loss, pain medication, PT, bracing and injection therapy. As for surgery we discussed the option of total knee replacement surgery. We reviewed total knee replacement in detail including the procedure, the implants, the recovery process, and long-term outcomes.  We reviewed that the risks of the surgery include but are not limited to infection, wound problems, stiffness, persistent pain, swelling, clicking, loosening, revision surgery.  We also reviewed less common risks such as neurovascular injury fracture, and other implant-related issues.  We reviewed other medical complications such as a blood clot.  We discussed that the vast majority of cases have a highly successful outcome.  However there is a small subset of patients that do experience complications or problems following the knee replacement and these problems can be very debilitating and painful and sometimes do not improve.     We discussed the risks and benefits of both operative and nonoperative treatment strategies.  Patient articulates that even though he is fully aware that he has not tried all nonoperative management strategies yet he would like to proceed with surgery given the success of his contralateral knee.  He articulates that he would like to get the underlying pathology addressed instead of postponing the procedure and struggling in the interim.  I believe this is a reasonable ask and therefore together we decided to proceed with scheduling a right total knee arthroplasty surgery.  We will work on scheduling surgery at a time that works well for them in the next few months.    He will contact us if they have any questions or concerns leading up to surgery. Before surgery the patient will attend a joint replacement class and will be seen by the anesthesiologist and dentist.    Postoperatively patient would like to use a CPM given his good experiences with this last time.  He would like to discuss with the anesthesiologist whether  spinal anesthesia would be possible to mitigate his post operative nausea that he experienced last time.  Patient is on aspirin 81 mg daily.     More information on joint replacements can be found on : [email or web address]    Thank you for your referral.    Jewel Marcial MD, PhD     Adult Reconstruction  [facility] Department of Orthopaedic Surgery  Pager [phone]      DATA for DOCUMENTATION:         Past Medical History:     Patient Active Problem List   Diagnosis     Primary osteoarthritis of left knee     Status post total left knee replacement     Primary osteoarthritis of right knee     Past Medical History:   Diagnosis Date     Arthritis     osteoarthritis     Coronary artery disease      Diabetes (H)      Hypertension      PONV (postoperative nausea and vomiting)      Stented coronary artery 01/2019       Also see scanned health assessment forms.       Past Surgical History:     Past Surgical History:   Procedure Laterality Date     ARTHROPLASTY KNEE Left 2016     BIOPSY      cecum            Social History:     Social History     Socioeconomic History     Marital status:      Spouse name: Not on file     Number of children: Not on file     Years of education: Not on file     Highest education level: Not on file   Occupational History     Not on file   Social Needs     Financial resource strain: Not on file     Food insecurity     Worry: Not on file     Inability: Not on file     Transportation needs     Medical: Not on file     Non-medical: Not on file   Tobacco Use     Smoking status: Never Smoker     Smokeless tobacco: Never Used   Substance and Sexual Activity     Alcohol use: Not Currently     Drug use: Never     Sexual activity: Not on file   Lifestyle     Physical activity     Days per week: Not on file     Minutes per session: Not on file     Stress: Not on file   Relationships     Social connections      Talks on phone: Not on file     Gets together: Not on file     Attends [employer] service: Not on file     Active member of club or organization: Not on file     Attends meetings of clubs or organizations: Not on file     Relationship status: Not on file     Intimate partner violence     Fear of current or ex partner: Not on file     Emotionally abused: Not on file     Physically abused: Not on file     Forced sexual activity: Not on file   Other Topics Concern     Not on file   Social History Narrative     Not on file            Family History:     No family history on file.         Medications:     Current Outpatient Medications   Medication Sig     acetaminophen (TYLENOL) 500 MG tablet Take 500 mg by mouth every 6 hours as needed      amoxicillin (AMOXIL) 500 MG capsule TAKE 4 CAPSULES BY MOUTHX1 DOSE 1 HOUR BEFORE DENTAL.     aspirin 81 MG tablet Take 81 mg by mouth daily      atorvastatin (LIPITOR) 40 MG tablet Take 40 mg by mouth daily      CHLORTHALIDONE PO Take 25 mg by mouth daily     metoprolol succinate ER (TOPROL-XL) 25 MG 24 hr tablet Take 25 mg by mouth daily      Omega-3 Fatty Acids (OMEGA-3 FISH OIL PO) Take 2 g by mouth daily      prasugrel (EFFIENT) 10 MG TABS tablet Take 10 mg by mouth daily      RANITIDINE HCL PO Take 150 mg by mouth daily as needed      No current facility-administered medications for this visit.               Review of Systems:   A comprehensive 10 point review of systems (constitutional, ENT, cardiac, peripheral vascular, lymphatic, respiratory, GI, , Musculoskeletal, skin, Neurological) was performed and found to be negative except as described in this note.     See intake form completed by patient      Answers for HPI/ROS submitted by the patient on 2/7/2021   General Symptoms: No  Skin Symptoms: No  HENT Symptoms: No  EYE SYMPTOMS: No  HEART SYMPTOMS: No  LUNG SYMPTOMS: No  INTESTINAL SYMPTOMS: No  URINARY SYMPTOMS: No  REPRODUCTIVE SYMPTOMS: No  SKELETAL SYMPTOMS:  Yes  BLOOD SYMPTOMS: No  NERVOUS SYSTEM SYMPTOMS: No  MENTAL HEALTH SYMPTOMS: No  Back pain: No  Muscle aches: No  Neck pain: No  Swollen joints: No  Joint pain: Yes  Bone pain: No  Muscle cramps: No  Muscle weakness: No  Joint stiffness: No  Bone fracture: No

## 2021-02-10 ENCOUNTER — TELEPHONE (OUTPATIENT)
Dept: ORTHOPEDICS | Facility: CLINIC | Age: 66
End: 2021-02-10

## 2021-02-10 ENCOUNTER — ANCILLARY PROCEDURE (OUTPATIENT)
Dept: GENERAL RADIOLOGY | Facility: CLINIC | Age: 66
End: 2021-02-10
Attending: STUDENT IN AN ORGANIZED HEALTH CARE EDUCATION/TRAINING PROGRAM
Payer: COMMERCIAL

## 2021-02-10 ENCOUNTER — PRE VISIT (OUTPATIENT)
Dept: ORTHOPEDICS | Facility: CLINIC | Age: 66
End: 2021-02-10

## 2021-02-10 ENCOUNTER — OFFICE VISIT (OUTPATIENT)
Dept: ORTHOPEDICS | Facility: CLINIC | Age: 66
End: 2021-02-10
Payer: COMMERCIAL

## 2021-02-10 DIAGNOSIS — M17.11 PRIMARY OSTEOARTHRITIS OF RIGHT KNEE: ICD-10-CM

## 2021-02-10 DIAGNOSIS — M17.11 PRIMARY OSTEOARTHRITIS OF RIGHT KNEE: Primary | ICD-10-CM

## 2021-02-10 PROCEDURE — 73560 X-RAY EXAM OF KNEE 1 OR 2: CPT | Mod: XU | Performed by: RADIOLOGY

## 2021-02-10 PROCEDURE — 77073 BONE LENGTH STUDIES: CPT | Performed by: RADIOLOGY

## 2021-02-10 PROCEDURE — 99214 OFFICE O/P EST MOD 30 MIN: CPT | Performed by: STUDENT IN AN ORGANIZED HEALTH CARE EDUCATION/TRAINING PROGRAM

## 2021-02-10 RX ORDER — TRANEXAMIC ACID 650 MG/1
1950 TABLET ORAL ONCE
Status: CANCELLED | OUTPATIENT
Start: 2021-02-10 | End: 2021-02-10

## 2021-02-10 RX ORDER — METFORMIN HCL 500 MG
TABLET, EXTENDED RELEASE 24 HR ORAL 2 TIMES DAILY WITH MEALS
COMMUNITY
Start: 2020-12-07

## 2021-02-10 RX ORDER — NITROGLYCERIN 0.4 MG/1
TABLET SUBLINGUAL
COMMUNITY
Start: 2020-03-20

## 2021-02-10 RX ORDER — CEFAZOLIN SODIUM 1 G/50ML
1 INJECTION, SOLUTION INTRAVENOUS SEE ADMIN INSTRUCTIONS
Status: CANCELLED | OUTPATIENT
Start: 2021-02-10

## 2021-02-10 RX ORDER — CEFAZOLIN SODIUM 2 G/50ML
2 SOLUTION INTRAVENOUS
Status: CANCELLED | OUTPATIENT
Start: 2021-02-10

## 2021-02-10 NOTE — TELEPHONE ENCOUNTER
Phoned patient to schedule surgery with Dr Marcial. I left him my direct number to call when he is able. [phone]   Previously Declined (within the last year)

## 2021-02-10 NOTE — TELEPHONE ENCOUNTER
Patient is scheduled for surgery with Dr. Marcial    Spoke or left message with: Patient    Date of Surgery: 3/18/21    Location: [facility]    Post op: 4 week, shceduled    Pre-op with surgeon (if applicable): Complete    H&P: Scheduled with PAC 2/25/21    Additional imaging/appointments: N/A    Surgery packet: Received in clinic     Additional comments: Patient aware he will receive a call a week before surgery to schedule his COVID test

## 2021-02-10 NOTE — LETTER
2/10/2021         RE: Shankar Armenta  [address]  [place] [address]        Dear Colleague,    Thank you for referring your patient, Shankar Armenta, to the [facility] [place]. Please see a copy of my visit note below.        [facility]  Orthopaedic Surgery Consultation by Jewel Marcial M.D.    Shankar Armenta MRN# [id number]   Age: 65 year old YOB: 1955     Requesting physician: No ref. provider found  Joshua Herman     Background history:  DX:  1. Bilateral knee osteoarthritis  2. Diabetes mellitus  3. Hypertension  4. Coronary artery disease and PTCA on aspirin.    TREATMENTS:  10/17/2016, left total knee arthroplasty, , Aakash lyathnakul femur size 5 tibia size 5, 32 mm asymmetric patella, 13 mm PS polyethylene liner.          History of Present Illness:   65 year old male who presents because of chronic and limiting pains of right knee.  Patient underwent a left total knee arthroplasty in 2016 for similar pains and has been experiencing great relief.  Over the past years his right knee started to progressively bother him.  He describes the presence of occasional night pain.  He actively prevents sitting down in one position for longer periods of time so is not endorsing initiation pain and stiffness.  He has seen a physical therapist in the past.  He wears a brace for comfort. He has not had injection therapy nor does use over-the-counter analgesics in a regular fashion.    After previous general anesthesia patient has been very nauseous waking up.  He would like to talk to an anesthesiologist in order to mitigate these complaints.    Current symptoms:  Problem: Right knee pain  Onset and duration: Multiple years  Awakens from sleep due to sx's:  No  Precipitating Injury:  No    Other joints or sites painful:  No    Social:   Occupation:   Living situation: Was together with spouse.  Has to walk stairs.  Hobbies / Sports:     Smoking: No  Alcohol:  No  Illicit drug use: No         Physical Exam:     EXAMINATION pertinent findings:   PSYCH: Pleasant, healthy-appearing, alert, oriented x3, cooperative. Normal mood and affect.  VITAL SIGNS: There were no vitals taken for this visit..  Reviewed nursing intake notes.   There is no height or weight on file to calculate BMI.  RESP: non labored breathing   ABD: benign, soft, non-tender, no acute peritoneal findings  SKIN: grossly normal   LYMPHATIC: grossly normal, no adenopathy, no extremity edema  NEURO: grossly normal , no motor deficits  VASCULAR: satisfactory perfusion of all extremities   MUSCULOSKELETAL:   Alignment: Varus alignment  Gait: Slightly antalgic over right lower extremity.    The right hip exhibits a full range of motion.  Rotations are nonpainful.  Lasegue's test is negative.    R knee: -5-0  .  Deep flexion is painful.  Straight leg raise +. No redness, warmth or skin changes present. Effusion minimal. Ligamentously stable in both ML and AP direction.  There is +1 laxity most likely due to substance loss.  Normal PF tracking with some crepitus.  Meniscal provocation tests are sensitive.  There is some tenderness to palpation over the medial joint line.     Right LE:   Thigh and leg compartments soft and compressible   +Quad/TA/GSC/FHL/EHL   SILT DP/SP/Trell/Saph/Tib nerve distributions   Palpable dorsalis pedis pulse              Data:   All laboratory data reviewed  All imaging studies reviewed by me personally.    XR knee right 4/15/2019 and 2/10/2021:  Significant osteoarthritic changes of the right knee most notably in the medial compartment which exhibits bone-on-bone arthritis under 2019 standing AP image.  There is also the presence of marginal osteophytes in the lateral compartment and patellofemoral joint where the articular joint space height is relatively well preserved.            Assessment and Plan:   Assessment:  65-year-old male with chronic right knee pain most likely caused  by the medial compartmental end-stage osteoarthritic changes.     Plan:  I had a long discussion with the patient regarding ongoing management options.  Reviewed surgical and nonsurgical treatments.  The non-surgical options include activity modification, weight loss, pain medication, PT, bracing and injection therapy. As for surgery we discussed the option of total knee replacement surgery. We reviewed total knee replacement in detail including the procedure, the implants, the recovery process, and long-term outcomes.  We reviewed that the risks of the surgery include but are not limited to infection, wound problems, stiffness, persistent pain, swelling, clicking, loosening, revision surgery.  We also reviewed less common risks such as neurovascular injury fracture, and other implant-related issues.  We reviewed other medical complications such as a blood clot.  We discussed that the vast majority of cases have a highly successful outcome.  However there is a small subset of patients that do experience complications or problems following the knee replacement and these problems can be very debilitating and painful and sometimes do not improve.     We discussed the risks and benefits of both operative and nonoperative treatment strategies.  Patient articulates that even though he is fully aware that he has not tried all nonoperative management strategies yet he would like to proceed with surgery given the success of his contralateral knee.  He articulates that he would like to get the underlying pathology addressed instead of postponing the procedure and struggling in the interim.  I believe this is a reasonable ask and therefore together we decided to proceed with scheduling a right total knee arthroplasty surgery.  We will work on scheduling surgery at a time that works well for them in the next few months.    He will contact us if they have any questions or concerns leading up to surgery. Before surgery the  patient will attend a joint replacement class and will be seen by the anesthesiologist and dentist.    Postoperatively patient would like to use a CPM given his good experiences with this last time.  He would like to discuss with the anesthesiologist whether spinal anesthesia would be possible to mitigate his post operative nausea that he experienced last time.  Patient is on aspirin 81 mg daily.     More information on joint replacements can be found on : [email or web address]    Thank you for your referral.    Jewel Marcial MD, PhD     Adult Reconstruction  [facility] Department of Orthopaedic Surgery  Pager [phone]      DATA for DOCUMENTATION:         Past Medical History:     Patient Active Problem List   Diagnosis     Primary osteoarthritis of left knee     Status post total left knee replacement     Primary osteoarthritis of right knee     Past Medical History:   Diagnosis Date     Arthritis     osteoarthritis     Coronary artery disease      Diabetes (H)      Hypertension      PONV (postoperative nausea and vomiting)      Stented coronary artery 01/2019       Also see scanned health assessment forms.       Past Surgical History:     Past Surgical History:   Procedure Laterality Date     ARTHROPLASTY KNEE Left 2016     BIOPSY      cecum            Social History:     Social History     Socioeconomic History     Marital status:      Spouse name: Not on file     Number of children: Not on file     Years of education: Not on file     Highest education level: Not on file   Occupational History     Not on file   Social Needs     Financial resource strain: Not on file     Food insecurity     Worry: Not on file     Inability: Not on file     Transportation needs     Medical: Not on file     Non-medical: Not on file   Tobacco Use     Smoking status: Never Smoker     Smokeless tobacco: Never Used   Substance and Sexual  Activity     Alcohol use: Not Currently     Drug use: Never     Sexual activity: Not on file   Lifestyle     Physical activity     Days per week: Not on file     Minutes per session: Not on file     Stress: Not on file   Relationships     Social connections     Talks on phone: Not on file     Gets together: Not on file     Attends Restorationist service: Not on file     Active member of club or organization: Not on file     Attends meetings of clubs or organizations: Not on file     Relationship status: Not on file     Intimate partner violence     Fear of current or ex partner: Not on file     Emotionally abused: Not on file     Physically abused: Not on file     Forced sexual activity: Not on file   Other Topics Concern     Not on file   Social History Narrative     Not on file            Family History:     No family history on file.         Medications:     Current Outpatient Medications   Medication Sig     acetaminophen (TYLENOL) 500 MG tablet Take 500 mg by mouth every 6 hours as needed      amoxicillin (AMOXIL) 500 MG capsule TAKE 4 CAPSULES BY MOUTHX1 DOSE 1 HOUR BEFORE DENTAL.     aspirin 81 MG tablet Take 81 mg by mouth daily      atorvastatin (LIPITOR) 40 MG tablet Take 40 mg by mouth daily      CHLORTHALIDONE PO Take 25 mg by mouth daily     metoprolol succinate ER (TOPROL-XL) 25 MG 24 hr tablet Take 25 mg by mouth daily      Omega-3 Fatty Acids (OMEGA-3 FISH OIL PO) Take 2 g by mouth daily      prasugrel (EFFIENT) 10 MG TABS tablet Take 10 mg by mouth daily      RANITIDINE HCL PO Take 150 mg by mouth daily as needed      No current facility-administered medications for this visit.               Review of Systems:   A comprehensive 10 point review of systems (constitutional, ENT, cardiac, peripheral vascular, lymphatic, respiratory, GI, , Musculoskeletal, skin, Neurological) was performed and found to be negative except as described in this note.     See intake form completed by patient      Answers for  HPI/ROS submitted by the patient on 2/7/2021   General Symptoms: No  Skin Symptoms: No  HENT Symptoms: No  EYE SYMPTOMS: No  HEART SYMPTOMS: No  LUNG SYMPTOMS: No  INTESTINAL SYMPTOMS: No  URINARY SYMPTOMS: No  REPRODUCTIVE SYMPTOMS: No  SKELETAL SYMPTOMS: Yes  BLOOD SYMPTOMS: No  NERVOUS SYSTEM SYMPTOMS: No  MENTAL HEALTH SYMPTOMS: No  Back pain: No  Muscle aches: No  Neck pain: No  Swollen joints: No  Joint pain: Yes  Bone pain: No  Muscle cramps: No  Muscle weakness: No  Joint stiffness: No  Bone fracture: No

## 2021-02-10 NOTE — NURSING NOTE
Teaching Flowsheet   Relevant Diagnosis: Right total knee arthroplasty  Teaching Topic: Pre op teaching     Person(s) involved in teaching:   Patient     Motivation Level:  Asks Questions: Yes  Eager to Learn: Yes  Cooperative: Yes  Receptive (willing/able to accept information): Yes  Any cultural factors/Christian beliefs that may influence understanding or compliance? No       Patient demonstrates understanding of the following:  Reason for the appointment, diagnosis and treatment plan: Yes  Knowledge of proper use of medications and conditions for which they are ordered (with special attention to potential side effects or drug interactions): Yes  Which situations necessitate calling provider and whom to contact: Yes       Teaching Concerns Addressed: RN discussed all aspects of pre and post op teaching with patient. Patient has had left total knee with Dr. Hua. Rosenda will call patient with surgery date and PAC appts. Patient has no further questions. Patient will however go to total joint class for refresher course. Patient is well aware regarding the antibiotics regimen prior to dental procedures.        Proper use and care of meds (medical equip, care aids, etc.): Yes  Nutritional needs and diet plan: Yes  Pain management techniques: Yes  Wound Care: Yes  How and/when to access community resources: Yes     Instructional Materials Used/Given: Pre op packet, antibacterial soap.     Time spent with patient: 15 minutes.

## 2021-02-10 NOTE — NURSING NOTE
Reason For Visit:   Chief Complaint   Patient presents with     Consult For     Discuss TKA - right       Primary MD: Joshua Herman    ?  No  Date of surgery: NA  Type of surgery: NA.  Smoker: No  Request smoking cessation information: No    There were no vitals taken for this visit.    Pain Assessment  Patient Currently in Pain: Denies(Pain is only with certain movements)  Primary Pain Location: Knee(Right)             Olivia Dobbs ATC

## 2021-02-11 NOTE — TELEPHONE ENCOUNTER
FUTURE VISIT INFORMATION      SURGERY INFORMATION:    Date: 3.18.21    Location:  OR    Surgeon:  Dr. Swanson    Anesthesia Type:  Choice    Procedure: R knee arthroplasty    Consult: 2.10.21    RECORDS REQUESTED FROM:       Primary Care Provider: Dr. Herman    Most recent EKG+ Tracin19 NM    Action 21 MJ   Action Taken Requested EKGs stips from [facility]     Action 21 MJ   Action Taken 2nd request sent.      Action 3.2.21 MJ   Action Taken 3rd request via fax. Called and LVM also.

## 2021-02-25 ENCOUNTER — PRE VISIT (OUTPATIENT)
Dept: SURGERY | Facility: CLINIC | Age: 66
End: 2021-02-25

## 2021-03-02 ENCOUNTER — ANESTHESIA EVENT (OUTPATIENT)
Dept: SURGERY | Facility: CLINIC | Age: 66
End: 2021-03-02

## 2021-03-02 ENCOUNTER — VIRTUAL VISIT (OUTPATIENT)
Dept: SURGERY | Facility: CLINIC | Age: 66
End: 2021-03-02
Payer: COMMERCIAL

## 2021-03-02 DIAGNOSIS — Z11.59 ENCOUNTER FOR SCREENING FOR OTHER VIRAL DISEASES: ICD-10-CM

## 2021-03-02 DIAGNOSIS — Z01.818 PRE-OP EVALUATION: Primary | ICD-10-CM

## 2021-03-02 DIAGNOSIS — M17.11 PRIMARY OSTEOARTHRITIS OF RIGHT KNEE: ICD-10-CM

## 2021-03-02 PROCEDURE — 99203 OFFICE O/P NEW LOW 30 MIN: CPT | Mod: GT | Performed by: PHYSICIAN ASSISTANT

## 2021-03-02 RX ORDER — CHLORTHALIDONE 25 MG/1
12.5 TABLET ORAL EVERY EVENING
COMMUNITY
Start: 2021-02-26

## 2021-03-02 ASSESSMENT — LIFESTYLE VARIABLES: TOBACCO_USE: 0

## 2021-03-02 ASSESSMENT — PAIN SCALES - GENERAL: PAINLEVEL: EXTREME PAIN (8)

## 2021-03-02 NOTE — H&P
Pre-Operative H & P         Video-Visit Details    Type of service:  Video Visit    Patient verbally consented to video service today: YES      Video Start Time: 0833  Video End Time (time video stopped): 0847    Originating Location (pt. Location): Home    Distant Location (provider location):  home    Mode of Communication:  Video Conference via [employer]      CC:  Preoperative exam to assess for increased cardiopulmonary risk while undergoing surgery and anesthesia.    Date of Encounter: 3/2/2021  Primary Care Physician:  Joshua Herman  Associated diagnosis: OA right knee    HPI  Shankar Armenta is a 65 year old male who presents for pre-operative H & P in preparation for R TKA with Dr. Marcial on 3/18/21 at [facility]. Patient is being evaluated for comorbid conditions of hypertension, CAD s/p stent in 2019, diabetes        Mr. Armenta has a history of chronic pain in his right knee. He is s/p L TKA in 2016 with great improvement in symptoms. He has noticed increasing pain in his right knee and was seen by Dr. Marcial to discuss treatment. He has tried PT and wearing a brace without significant improvement.  He is now scheduled for the above procedure.      History is obtained from the patient and chart review.    Past Medical History  Past Medical History:   Diagnosis Date     Arthritis     osteoarthritis     Coronary artery disease      Diabetes (H)      Hypertension      PONV (postoperative nausea and vomiting)      Stented coronary artery 01/2019       Past Surgical History  Past Surgical History:   Procedure Laterality Date     ARTHROPLASTY KNEE Left 2016     ARTHROSCOPY KNEE       BIOPSY      cecum     COLONOSCOPY         Hx of Blood transfusions/reactions: denies     Hx of abnormal bleeding or anti-platelet use: ASA 81 mg    Menstrual history: No LMP for male patient.    Steroid use in the last year: denies    Personal or FH with difficulty with  Anesthesia:  Patient has a history of PONV, but has no family history of anesthesia complications.      Prior to Admission Medications  Current Outpatient Medications   Medication Sig Dispense Refill     aspirin 81 MG tablet Take 81 mg by mouth every morning        atorvastatin (LIPITOR) 40 MG tablet Take 40 mg by mouth every morning   3     chlorthalidone (HYGROTON) 25 MG tablet Take 12.5 mg by mouth every evening        metFORMIN (GLUCOPHAGE-XR) 500 MG 24 hr tablet 2 times daily (with meals)        metoprolol succinate ER (TOPROL-XL) 25 MG 24 hr tablet Take 25 mg by mouth every evening        nitroGLYcerin (NITROSTAT) 0.4 MG sublingual tablet        Omega-3 Fatty Acids (OMEGA-3 FISH OIL PO) Take 2 g by mouth every morning        amoxicillin (AMOXIL) 500 MG capsule TAKE 4 CAPSULES BY MOUTHX1 DOSE 1 HOUR BEFORE DENTAL. 12 capsule 1       Allergies  Allergies   Allergen Reactions     Fentanyl Nausea     Oxycodone Nausea and Vomiting       Social History  Social History     Socioeconomic History     Marital status:      Spouse name: Not on file     Number of children: Not on file     Years of education: Not on file     Highest education level: Not on file   Occupational History     Not on file   Social Needs     Financial resource strain: Not on file     Food insecurity     Worry: Not on file     Inability: Not on file     Transportation needs     Medical: Not on file     Non-medical: Not on file   Tobacco Use     Smoking status: Never Smoker     Smokeless tobacco: Never Used   Substance and Sexual Activity     Alcohol use: Not Currently     Drug use: Never     Sexual activity: Not on file   Lifestyle     Physical activity     Days per week: Not on file     Minutes per session: Not on file     Stress: Not on file   Relationships     Social connections     Talks on phone: Not on file     Gets together: Not on file     Attends Protestant service: Not on file     Active member of club or organization: Not on file      Attends meetings of clubs or organizations: Not on file     Relationship status: Not on file     Intimate partner violence     Fear of current or ex partner: Not on file     Emotionally abused: Not on file     Physically abused: Not on file     Forced sexual activity: Not on file   Other Topics Concern     Not on file   Social History Narrative     Not on file       Family History  Family History   Problem Relation Age of Onset     Anesthesia Reaction No family hx of      Deep Vein Thrombosis (DVT) No family hx of      ROS/MED HX  ENT/Pulmonary:  - neg pulmonary ROS  (-) tobacco use   Neurologic:  - neg neurologic ROS     Cardiovascular:     (+) hypertension--CAD --stent-2019. Taking blood thinners Previous cardiac testing   Echo: Date: 1/2019 Results:  Interpretation Summary     * The left ventricular systolic function is normal, estimated LVEF 60-65%.     * Asymmetric septal hypertrophy likely representing sigmoid septum (septum    1.5 cm, posterior wall 0.96 cm).      * Abnormal septal motion secondary to conduction abnormality.      * Chordal KB present (no LVOT obstruction, peak gradient 5 mmHg).      * No prior study . Consider further cardiac evaluation for HCM given    clinical indication.      Stress Test:  Date: Results:    ECG Reviewed:  Date: 11/2019 Results:  SR with first degree AV block  Cath:  Date: 1/2019 Results:  Conclusions      1. 99% distal circumflex stenosis.      2. COMMENTS:  discussed results with Dr. Mccollum, the referring    cardiologist.  Following the discussion, recommendation was to proceed with    PCI.        METS/Exercise Tolerance: >4 METS Comment: Can ascend stairs and walk 1 mile with a brace   Hematologic:  - neg hematologic  ROS  (-) history of blood transfusion   Musculoskeletal: Comment: S/p L TKA  (+) arthritis,     GI/Hepatic:     (+) GERD (intermittent, uses TUMS PRN), Asymptomatic on medication,     Renal/Genitourinary:  - neg Renal ROS     Endo:     (+) type II DM,  Last HgA1c: 7.3, date: 2019, Not using insulin, - not using insulin pump. Normal glucose range: 140-160,     Psychiatric/Substance Use:  - neg psychiatric ROS     Infectious Disease:  - neg infectious disease ROS     Malignancy:  - neg malignancy ROS     Other:          The complete review of systems is negative other than noted in the HPI or here.    0 lbs 0 oz  Data Unavailable   There is no height or weight on file to calculate BMI.       Physical Exam  Constitutional: Awake, alert, cooperative, no apparent distress, and appears stated age.  Respiratory: non labored breathing   Neuropsychiatric: Calm, cooperative. Normal affect.     Please refer to the physical examination documented by the anesthesiologist in the anesthesia record on the day of surgery    Labs: (personally reviewed)  Patient will update CBC, BMP, A1c and T&S prior to surgery. I will watch for results    EKG 11/2019   SINUS RHYTHM WITH FIRST DEGREE AV BLOCK   NONSPECIFIC T-WAVE ABNORMALITY   Compared to ECG 01/24/2019 05:59:13   Intraventricular conduction delay no longer present   T-wave abnormality still present      ECHO 1/2019   Interpretation Summary     * The left ventricular systolic function is normal, estimated LVEF 60-65%.     * Asymmetric septal hypertrophy likely representing sigmoid septum (septum   1.5 cm, posterior wall 0.96 cm).     * Abnormal septal motion secondary to conduction abnormality.     * Chordal KB present (no LVOT obstruction, peak gradient 5 mmHg).     * No prior study . Consider further cardiac evaluation for HCM given   clinical indication.      Cardiac cath 1/2019   Conclusions     1. 99% distal circumflex stenosis.     2. COMMENTS:  discussed results with Dr. Mccollum, the referring   cardiologist.  Following the discussion, recommendation was to proceed with   PCI.          Outside records reviewed from: care everywhere    ASSESSMENT and PLAN  Shankar Armenta is a 65 year old male scheduled for R TKA on 3/18/21 by  Dr. Marcial in treatment of OA.  PAC referral for risk assessment and optimization for anesthesia with comorbid conditions of hypertension, CAD s/p stent in 2019, diabetes:        Pre-operative considerations:    1.  Cardiac:  Functional status- METS >4- patient states he walks about 1 mile and can ascend stairs without LEACH. h/o CAD s/p PCI 1/2019. Denies any cardiac symptoms since that time.  hypertension using metoprolol and chlorthalidone. dyslipidemia using Lipitor. Previous cardiac testing as above. Will repeat EKG prior to surgery.   intermediate risk surgery with 0.9% (RCRI #) risk of major adverse cardiac event.        2.  Pulm:  TORIE risk: intermediate. non smoker. denies pulmonary symptoms. COVID testing per surgery team.         3.  GI:  Risk of PONV score = 2.  If > 2, anti-emetic intervention recommended.        4.  Endo: diabetes using metformin. A1c in 2019 was 7.3. Patient states glucose runs 140-160.  Will repeat prior to surgery.       5. Msk: h/o L TKA. now with right knee pain and the above procedure planned.         VTE risk: 1.8%        Patient is optimized and is acceptable candidate for the proposed procedure, pending lab results and EKG results.    Patient discussed with Dr. Burr    **Physical exam and vital signs not completed today as this visit was scheduled as a virtual visit during Covid 19 pandemic. Physical exam should be completed the DOS in pre-op**    Addendum: lab and EKG results available. Stable and okay for surgery.    40 minutes were spent completing chart review, seeing the patient, reviewing labs and test results, discussing patient care with anesthesia and completing documentation      Jacey Duong PA-C  Preoperative Assessment Center  [facility]  Phone: [phone]  Fax: [phone]

## 2021-03-02 NOTE — PROGRESS NOTES
Shankar is a 65 year old who is being evaluated via a billable video visit.      How would you like to obtain your AVS? MyChart    Will anyone else be joining your video visit? No      HPI           Review of Systems         Objective    Vitals - Patient Reported  Pain Score: Extreme Pain (8)  Pain Loc: Knee        Physical Exam     N Sarwat RITCHIE

## 2021-03-02 NOTE — ANESTHESIA PREPROCEDURE EVALUATION
Anesthesia Pre-Procedure Evaluation    Patient: Shankar Armenta   MRN: [id number] : 1955        Preoperative Diagnosis: * No surgery found *   Procedure :      Past Medical History:   Diagnosis Date     Arthritis     osteoarthritis     Coronary artery disease      Diabetes (H)      Hypertension      PONV (postoperative nausea and vomiting)      Stented coronary artery 2019      Past Surgical History:   Procedure Laterality Date     ARTHROPLASTY KNEE Left 2016     BIOPSY      cecum      Allergies   Allergen Reactions     Fentanyl Nausea     Oxycodone Nausea and Vomiting      Social History     Tobacco Use     Smoking status: Never Smoker     Smokeless tobacco: Never Used   Substance Use Topics     Alcohol use: Not Currently      Wt Readings from Last 1 Encounters:   04/15/19 94.6 kg (208 lb 9.6 oz)        Anesthesia Evaluation   Pt has had prior anesthetic.     History of anesthetic complications  - PONV.      ROS/MED HX  ENT/Pulmonary:  - neg pulmonary ROS  (-) tobacco use   Neurologic:  - neg neurologic ROS     Cardiovascular:     (+) hypertension--CAD --stent-. Taking blood thinners Previous cardiac testing   Echo: Date: 2019 Results:  Interpretation Summary    * The left ventricular systolic function is normal, estimated LVEF 60-65%.    * Asymmetric septal hypertrophy likely representing sigmoid septum (septum   1.5 cm, posterior wall 0.96 cm).     * Abnormal septal motion secondary to conduction abnormality.     * Chordal KB present (no LVOT obstruction, peak gradient 5 mmHg).     * No prior study . Consider further cardiac evaluation for HCM given   clinical indication.     Stress Test:  Date: Results:    ECG Reviewed:  Date: 2019 Results:  SR with first degree AV block  Cath:  Date: 2019 Results:  Conclusions     1. 99% distal circumflex stenosis.     2. COMMENTS:  discussed results with Dr. Mccollum, the referring   cardiologist.  Following the discussion, recommendation was to proceed  with   PCI.       METS/Exercise Tolerance: >4 METS Comment: Can ascend stairs and walk 1 mile with a brace   Hematologic:  - neg hematologic  ROS  (-) history of blood transfusion   Musculoskeletal: Comment: S/p L TKA  (+) arthritis,     GI/Hepatic:     (+) GERD (intermittent, uses TUMS PRN), Asymptomatic on medication,     Renal/Genitourinary:  - neg Renal ROS     Endo:     (+) type II DM, Last HgA1c: 7.3, date: 2019, Not using insulin, - not using insulin pump. Normal glucose range: 140-160,     Psychiatric/Substance Use:  - neg psychiatric ROS     Infectious Disease:  - neg infectious disease ROS     Malignancy:  - neg malignancy ROS     Other:               OUTSIDE LABS:  CBC: No results found for: WBC, HGB, HCT, PLT  BMP: No results found for: NA, POTASSIUM, CHLORIDE, CO2, BUN, CR, GLC  COAGS: No results found for: PTT, INR, FIBR  POC: No results found for: BGM, HCG, HCGS  HEPATIC: No results found for: ALBUMIN, PROTTOTAL, ALT, AST, GGT, ALKPHOS, BILITOTAL, BILIDIRECT, CHELA  OTHER: No results found for: PH, LACT, A1C, ANABELA, PHOS, MAG, LIPASE, AMYLASE, TSH, T4, T3, CRP, SED          PAC Discussion and Assessment    ASA Classification: 3  Case is suitable for: [facility]  PAC Recommendations anesthetic techniques: choice.                  PAC Resident/NP Anesthesia Assessment: Shankar Armenta is a 65 year old male scheduled for R TKA on 3/18/21 by Dr. Marcial in treatment of OA.  PAC referral for risk assessment and optimization for anesthesia with comorbid conditions of hypertension, CAD s/p stent in 2019, diabetes:        Pre-operative considerations:    1.  Cardiac:  Functional status- METS >4- patient states he walks about 1 mile and can ascend stairs without LEACH. h/o CAD s/p PCI 1/2019. Denies any cardiac symptoms since that time.  hypertension using metoprolol and chlorthalidone. dyslipidemia using Lipitor. Previous cardiac testing as above. Will repeat EKG prior to surgery.   intermediate risk surgery with  0.9% (RCRI #) risk of major adverse cardiac event.        2.  Pulm:  TORIE risk: intermediate. non smoker. denies pulmonary symptoms. COVID testing per surgery team.         3.  GI:  Risk of PONV score = 2.  If > 2, anti-emetic intervention recommended.        4.  Endo: diabetes using metformin. A1c in 2019 was 7.3. Patient states glucose runs 140-160.  Will repeat prior to surgery.       5. Msk: h/o L TKA. now with right knee pain and the above procedure planned.         VTE risk: 1.8%        Patient is optimized and is acceptable candidate for the proposed procedure, pending lab results and EKG results.    Patient discussed with Dr. Burr    **Physical exam and vital signs not completed today as this visit was scheduled as a virtual visit during Covid 19 pandemic. Physical exam should be completed the DOS in pre-op**    Addendum: lab and EKG results available. Stable and okay for surgery.    **For further details of assessment and testing please see H and P completed on same date.            MONO Lang PA-C

## 2021-03-02 NOTE — PATIENT INSTRUCTIONS
Preparing for Your Surgery      Name:  Shankar Armenta   MRN:  [id number]   :  1955   Today's Date:  3/2/2021       Arriving for surgery:  Surgery date:  3/18/21  Arrival time:  7:15 am    Restrictions due to COVID 19:  One consistent visitor per patient is allowed  No ill visitors  All visitors must wear face mask     parking is available for anyone with mobility limitations or disabilities.  ([place]  24 hours/ 7 days a week; [facility]  7 am- 3:30 pm, Mon- Fri)    Please come to:     [facility] [address]  [address] [place] [place] [address]    - Enter through the [facility] entrance.    - parking is available in front of [facility] from 5:15AM to 8:00PM. If you prefer, park your car in the Green Lot.    -Proceed to the 3rd floor, check in at the Adult Surgery Waiting Lounge. [phone]    If an escort is needed stop at the Information Desk in the lobby. Inform the information person that you are here for surgery. An escort to the Adult Surgery Waiting Lounge will be provided.        What can I eat or drink?  -  You may eat and drink normally for up to 8 hours before your surgery. (Until 1:00 am)  -  You may have clear liquids until 2 hours before surgery. (Until 7:15 am)    Examples of clear liquids:  Water  Clear broth  Juices (apple, white grape, white cranberry  and cider) without pulp  Noncarbonated, powder based beverages  (lemonade and Santi-Aid)  Sodas (Sprite, 7-Up, ginger ale and seltzer)  Coffee or tea (without milk or cream)  Gatorade    -  No Alcohol for at least 24 hours before surgery     Which medicines can I take?    **Hold Aspirin for 7 days before surgery - take your last dose on 3/10/21.**      Hold Multivitamins for 7 days before surgery.     **Hold Supplements (including FISH OIL) for 7 days before surgery - take your last dose on 3/10/21.**    Hold Ibuprofen (Advil, Motrin) for 1  day before surgery--unless otherwise directed by surgeon.  Hold Naproxen (Aleve) for 4 days before surgery.    -  DO NOT take these medications the day of surgery:  Chlorthalidone (Hygroton)  Metformin (Glucophage)    -  PLEASE TAKE these medications the day of surgery:   Atorvastatin (Lipitor)  Metoprolol (Toprol)  Amoxicillin (Amoxil)    How do I prepare myself?  - Please take 2 showers before surgery using Scrubcare or Hibiclens soap.    Use this soap only from the neck to your toes.     Leave the soap on your skin for one minute--then rinse thoroughly.      You may use your own shampoo and conditioner; no other hair products.   - Please remove all jewelry and body piercings.  - No lotions, deodorants or fragrance.  - Bring your ID and insurance card.    - All patients are required to have a Covid-19 test within 4 days of surgery/procedure.      -Patients will be contacted by the [facility] scheduling team within 1 week of surgery to make an appointment.      - Patients may call the Scheduling team at [phone] if they have not been scheduled within 4 days of  surgery.      ALL PATIENTS GOING HOME THE SAME DAY OF SURGERY ARE REQUIRED TO HAVE A RESPONSIBLE ADULT TO DRIVE AND BE IN ATTENDANCE WITH THEM FOR 24 HOURS FOLLOWING SURGERY     Questions or Concerns:    - For any questions regarding the day of surgery or your hospital stay, please contact the Pre Admission Nursing Office at [phone].       - If you have health changes between today and your surgery please call your surgeon.       For questions after surgery please call your surgeons office.      Enhanced Recovery After Surgery     This is a team effort, including you, to get you back on your feet, eating and drinking normally and out of the hospital as quickly as possible.  The goals are:    1) NO INFECTIONS and   2) RETURN TO NORMAL DIET    How can we achieve these goals?  1) STAY ACTIVE: Walk every day before your surgery; try to increase  the amount every day.  Walk after surgery as much as you can-the nurses will help you.  Walking speeds healing and gets you home quicker, you heal better at home and have less risk of infection.       2) STAY HYDRATED: Drink clear liquids up until 2 hours before your surgery. We would like you to purchase a drink such as Gatorade or Ensure Clear (not the milkshake type).  Drink this before bedtime and on the way into the hospital, drink between 8-10 ounces or until you feel hydrated.  Keeping well hydrated leads to your veins being plump, you wake up faster, and you are less likely to be nauseated. Start drinking water as soon as you can after surgery and advance to clear liquids and food as tolerated.  IV fluids contain salt, drinking fluids will minimize the amount of IV fluids you need and decrease the amount of salt you get.    The most common reason for the patient to be readmitted is dehydration. Staying hydrated after you go home from the hospital is very important.  Ensure or Ensure Clear are good options to keep you hydrated.       Our goal is to keep you comfortable; eating and drinking normally and back home safely.

## 2021-03-03 ENCOUNTER — APPOINTMENT (OUTPATIENT)
Dept: LAB | Facility: CLINIC | Age: 66
End: 2021-03-03
Attending: PHYSICIAN ASSISTANT
Payer: COMMERCIAL

## 2021-03-03 DIAGNOSIS — Z01.818 PRE-OP EVALUATION: ICD-10-CM

## 2021-03-03 LAB
ANION GAP SERPL CALCULATED.3IONS-SCNC: 6 MMOL/L (ref 3–14)
BUN SERPL-MCNC: 22 MG/DL (ref 7–30)
CALCIUM SERPL-MCNC: 9.4 MG/DL (ref 8.5–10.1)
CHLORIDE SERPL-SCNC: 102 MMOL/L (ref 94–109)
CO2 SERPL-SCNC: 27 MMOL/L (ref 20–32)
CREAT SERPL-MCNC: 0.96 MG/DL (ref 0.66–1.25)
ERYTHROCYTE [DISTWIDTH] IN BLOOD BY AUTOMATED COUNT: 12.9 % (ref 10–15)
GFR SERPL CREATININE-BSD FRML MDRD: 82 ML/MIN/{1.73_M2}
GLUCOSE SERPL-MCNC: 207 MG/DL (ref 70–99)
HBA1C MFR BLD: 7.3 % (ref 0–5.6)
HCT VFR BLD AUTO: 46 % (ref 40–53)
HGB BLD-MCNC: 15.8 G/DL (ref 13.3–17.7)
MCH RBC QN AUTO: 29.9 PG (ref 26.5–33)
MCHC RBC AUTO-ENTMCNC: 34.3 G/DL (ref 31.5–36.5)
MCV RBC AUTO: 87 FL (ref 78–100)
PLATELET # BLD AUTO: 208 10E9/L (ref 150–450)
POTASSIUM SERPL-SCNC: 3.9 MMOL/L (ref 3.4–5.3)
RBC # BLD AUTO: 5.29 10E12/L (ref 4.4–5.9)
SODIUM SERPL-SCNC: 135 MMOL/L (ref 133–144)
WBC # BLD AUTO: 8.3 10E9/L (ref 4–11)

## 2021-03-03 PROCEDURE — 85027 COMPLETE CBC AUTOMATED: CPT | Performed by: PATHOLOGY

## 2021-03-03 PROCEDURE — 86850 RBC ANTIBODY SCREEN: CPT | Performed by: PATHOLOGY

## 2021-03-03 PROCEDURE — 80048 BASIC METABOLIC PNL TOTAL CA: CPT | Performed by: PATHOLOGY

## 2021-03-03 PROCEDURE — 83036 HEMOGLOBIN GLYCOSYLATED A1C: CPT | Performed by: PATHOLOGY

## 2021-03-03 PROCEDURE — 86900 BLOOD TYPING SEROLOGIC ABO: CPT | Performed by: PATHOLOGY

## 2021-03-03 PROCEDURE — 36415 COLL VENOUS BLD VENIPUNCTURE: CPT | Performed by: PATHOLOGY

## 2021-03-03 PROCEDURE — 86901 BLOOD TYPING SEROLOGIC RH(D): CPT | Performed by: PATHOLOGY

## 2021-03-04 ENCOUNTER — TELEPHONE (OUTPATIENT)
Dept: ORTHOPEDICS | Facility: CLINIC | Age: 66
End: 2021-03-04

## 2021-03-04 LAB — INTERPRETATION ECG - MUSE: NORMAL

## 2021-03-04 NOTE — TELEPHONE ENCOUNTER
[facility] Call Center    Phone Message    May a detailed message be left on voicemail: yes     Reason for Call: Other: clarify      Action Taken: Message routed to:  [facility] ([facility] ortho    Travel Screening: Not Applicable     Patient has COVID vaccine scheduled for 3/8 but has surgery on 3/18  --- he wants to know if he should cancel the vaccine appt. Or if he will be fine to get vaccine on 3/8 and surgery on 3/18     Please call and clarify

## 2021-03-05 NOTE — TELEPHONE ENCOUNTER
Called and spoke to pt. I informed him that he is fine to get the vaccine as long as it is not three days before or after surgery. He does not need to cancel his vaccine apt. Pt understood and was happy to hear this. He had no other questions at this time.

## 2021-03-15 DIAGNOSIS — Z11.59 ENCOUNTER FOR SCREENING FOR OTHER VIRAL DISEASES: ICD-10-CM

## 2021-03-15 LAB
LABORATORY COMMENT REPORT: NORMAL
SARS-COV-2 RNA RESP QL NAA+PROBE: NEGATIVE
SARS-COV-2 RNA RESP QL NAA+PROBE: NORMAL
SPECIMEN SOURCE: NORMAL
SPECIMEN SOURCE: NORMAL

## 2021-03-15 PROCEDURE — U0005 INFEC AGEN DETEC AMPLI PROBE: HCPCS | Performed by: STUDENT IN AN ORGANIZED HEALTH CARE EDUCATION/TRAINING PROGRAM

## 2021-03-15 PROCEDURE — U0003 INFECTIOUS AGENT DETECTION BY NUCLEIC ACID (DNA OR RNA); SEVERE ACUTE RESPIRATORY SYNDROME CORONAVIRUS 2 (SARS-COV-2) (CORONAVIRUS DISEASE [COVID-19]), AMPLIFIED PROBE TECHNIQUE, MAKING USE OF HIGH THROUGHPUT TECHNOLOGIES AS DESCRIBED BY CMS-2020-01-R: HCPCS | Performed by: STUDENT IN AN ORGANIZED HEALTH CARE EDUCATION/TRAINING PROGRAM

## 2021-03-17 ENCOUNTER — ANESTHESIA EVENT (OUTPATIENT)
Dept: SURGERY | Facility: CLINIC | Age: 66
End: 2021-03-17
Payer: COMMERCIAL

## 2021-03-18 ENCOUNTER — APPOINTMENT (OUTPATIENT)
Dept: PHYSICAL THERAPY | Facility: CLINIC | Age: 66
End: 2021-03-18
Attending: STUDENT IN AN ORGANIZED HEALTH CARE EDUCATION/TRAINING PROGRAM
Payer: COMMERCIAL

## 2021-03-18 ENCOUNTER — ANESTHESIA (OUTPATIENT)
Dept: SURGERY | Facility: CLINIC | Age: 66
End: 2021-03-18
Payer: COMMERCIAL

## 2021-03-18 ENCOUNTER — APPOINTMENT (OUTPATIENT)
Dept: GENERAL RADIOLOGY | Facility: CLINIC | Age: 66
End: 2021-03-18
Attending: STUDENT IN AN ORGANIZED HEALTH CARE EDUCATION/TRAINING PROGRAM
Payer: COMMERCIAL

## 2021-03-18 ENCOUNTER — HOSPITAL ENCOUNTER (OUTPATIENT)
Facility: CLINIC | Age: 66
Discharge: HOME | End: 2021-03-18
Attending: STUDENT IN AN ORGANIZED HEALTH CARE EDUCATION/TRAINING PROGRAM | Admitting: STUDENT IN AN ORGANIZED HEALTH CARE EDUCATION/TRAINING PROGRAM
Payer: COMMERCIAL

## 2021-03-18 VITALS
HEART RATE: 71 BPM | OXYGEN SATURATION: 97 % | BODY MASS INDEX: 29.88 KG/M2 | RESPIRATION RATE: 16 BRPM | SYSTOLIC BLOOD PRESSURE: 147 MMHG | WEIGHT: 213.41 LBS | DIASTOLIC BLOOD PRESSURE: 83 MMHG | TEMPERATURE: 97.5 F | HEIGHT: 71 IN

## 2021-03-18 DIAGNOSIS — M17.11 PRIMARY OSTEOARTHRITIS OF RIGHT KNEE: ICD-10-CM

## 2021-03-18 DIAGNOSIS — M17.12 PRIMARY OSTEOARTHRITIS OF LEFT KNEE: Primary | ICD-10-CM

## 2021-03-18 DIAGNOSIS — Z96.652 STATUS POST TOTAL LEFT KNEE REPLACEMENT: ICD-10-CM

## 2021-03-18 LAB
ABO + RH BLD: NORMAL
ABO + RH BLD: NORMAL
BLD GP AB SCN SERPL QL: NORMAL
BLOOD BANK CMNT PATIENT-IMP: NORMAL
BLOOD BANK CMNT PATIENT-IMP: NORMAL
GLUCOSE BLDC GLUCOMTR-MCNC: 193 MG/DL (ref 70–99)
GLUCOSE BLDC GLUCOMTR-MCNC: 213 MG/DL (ref 70–99)
POTASSIUM SERPL-SCNC: 3.6 MMOL/L (ref 3.4–5.3)
SPECIMEN EXP DATE BLD: NORMAL

## 2021-03-18 PROCEDURE — 84132 ASSAY OF SERUM POTASSIUM: CPT | Performed by: ANESTHESIOLOGY

## 2021-03-18 PROCEDURE — 999N000065 XR KNEE PORT RT 1/2 VW: Mod: RT

## 2021-03-18 PROCEDURE — 999N000141 HC STATISTIC PRE-PROCEDURE NURSING ASSESSMENT: Performed by: STUDENT IN AN ORGANIZED HEALTH CARE EDUCATION/TRAINING PROGRAM

## 2021-03-18 PROCEDURE — 36415 COLL VENOUS BLD VENIPUNCTURE: CPT | Performed by: ANESTHESIOLOGY

## 2021-03-18 PROCEDURE — 250N000009 HC RX 250: Performed by: STUDENT IN AN ORGANIZED HEALTH CARE EDUCATION/TRAINING PROGRAM

## 2021-03-18 PROCEDURE — 258N000001 HC RX 258: Performed by: STUDENT IN AN ORGANIZED HEALTH CARE EDUCATION/TRAINING PROGRAM

## 2021-03-18 PROCEDURE — 97110 THERAPEUTIC EXERCISES: CPT | Mod: GP

## 2021-03-18 PROCEDURE — 97116 GAIT TRAINING THERAPY: CPT | Mod: GP

## 2021-03-18 PROCEDURE — 710N000012 HC RECOVERY PHASE 2, PER MINUTE: Performed by: STUDENT IN AN ORGANIZED HEALTH CARE EDUCATION/TRAINING PROGRAM

## 2021-03-18 PROCEDURE — 258N000003 HC RX IP 258 OP 636: Performed by: STUDENT IN AN ORGANIZED HEALTH CARE EDUCATION/TRAINING PROGRAM

## 2021-03-18 PROCEDURE — 360N000077 HC SURGERY LEVEL 4, PER MIN: Performed by: STUDENT IN AN ORGANIZED HEALTH CARE EDUCATION/TRAINING PROGRAM

## 2021-03-18 PROCEDURE — 250N000011 HC RX IP 250 OP 636: Performed by: ANESTHESIOLOGY

## 2021-03-18 PROCEDURE — 272N000001 HC OR GENERAL SUPPLY STERILE: Performed by: STUDENT IN AN ORGANIZED HEALTH CARE EDUCATION/TRAINING PROGRAM

## 2021-03-18 PROCEDURE — 97161 PT EVAL LOW COMPLEX 20 MIN: CPT | Mod: GP

## 2021-03-18 PROCEDURE — 370N000017 HC ANESTHESIA TECHNICAL FEE, PER MIN: Performed by: STUDENT IN AN ORGANIZED HEALTH CARE EDUCATION/TRAINING PROGRAM

## 2021-03-18 PROCEDURE — C1776 JOINT DEVICE (IMPLANTABLE): HCPCS | Performed by: STUDENT IN AN ORGANIZED HEALTH CARE EDUCATION/TRAINING PROGRAM

## 2021-03-18 PROCEDURE — 278N000051 HC OR IMPLANT GENERAL: Performed by: STUDENT IN AN ORGANIZED HEALTH CARE EDUCATION/TRAINING PROGRAM

## 2021-03-18 PROCEDURE — 250N000009 HC RX 250: Performed by: ANESTHESIOLOGY

## 2021-03-18 PROCEDURE — 710N000009 HC RECOVERY PHASE 1, LEVEL 1, PER MIN: Performed by: STUDENT IN AN ORGANIZED HEALTH CARE EDUCATION/TRAINING PROGRAM

## 2021-03-18 PROCEDURE — 250N000011 HC RX IP 250 OP 636: Performed by: STUDENT IN AN ORGANIZED HEALTH CARE EDUCATION/TRAINING PROGRAM

## 2021-03-18 PROCEDURE — 250N000012 HC RX MED GY IP 250 OP 636 PS 637: Performed by: ANESTHESIOLOGY

## 2021-03-18 PROCEDURE — 250N000011 HC RX IP 250 OP 636: Performed by: NURSE ANESTHETIST, CERTIFIED REGISTERED

## 2021-03-18 PROCEDURE — 73560 X-RAY EXAM OF KNEE 1 OR 2: CPT | Mod: 26 | Performed by: RADIOLOGY

## 2021-03-18 PROCEDURE — 250N000009 HC RX 250: Performed by: NURSE ANESTHETIST, CERTIFIED REGISTERED

## 2021-03-18 PROCEDURE — 258N000003 HC RX IP 258 OP 636: Performed by: NURSE ANESTHETIST, CERTIFIED REGISTERED

## 2021-03-18 PROCEDURE — 82962 GLUCOSE BLOOD TEST: CPT | Mod: 91

## 2021-03-18 PROCEDURE — 258N000003 HC RX IP 258 OP 636: Performed by: ANESTHESIOLOGY

## 2021-03-18 PROCEDURE — 250N000013 HC RX MED GY IP 250 OP 250 PS 637: Performed by: STUDENT IN AN ORGANIZED HEALTH CARE EDUCATION/TRAINING PROGRAM

## 2021-03-18 DEVICE — TIBIAL BEARING INSERT - PS
Type: IMPLANTABLE DEVICE | Site: KNEE | Status: FUNCTIONAL
Brand: TRIATHLON

## 2021-03-18 DEVICE — POSTERIOR STABILIZED FEMORAL
Type: IMPLANTABLE DEVICE | Site: KNEE | Status: FUNCTIONAL
Brand: TRIATHLON

## 2021-03-18 DEVICE — PATELLA
Type: IMPLANTABLE DEVICE | Site: KNEE | Status: FUNCTIONAL
Brand: TRIATHLON

## 2021-03-18 DEVICE — BONE CEMENT SIMPLEX FULL DOSE 6191-1-001: Type: IMPLANTABLE DEVICE | Site: KNEE | Status: FUNCTIONAL

## 2021-03-18 DEVICE — PRIMARY TIBIAL BASEPLATE
Type: IMPLANTABLE DEVICE | Site: KNEE | Status: FUNCTIONAL
Brand: TRIATHLON

## 2021-03-18 RX ORDER — AMOXICILLIN 250 MG
1-2 CAPSULE ORAL 2 TIMES DAILY
Qty: 30 TABLET | Refills: 0 | Status: SHIPPED | OUTPATIENT
Start: 2021-03-18

## 2021-03-18 RX ORDER — ONDANSETRON 4 MG/1
4 TABLET, ORALLY DISINTEGRATING ORAL EVERY 30 MIN PRN
Status: DISCONTINUED | OUTPATIENT
Start: 2021-03-18 | End: 2021-03-18 | Stop reason: HOSPADM

## 2021-03-18 RX ORDER — PROCHLORPERAZINE MALEATE 5 MG
5 TABLET ORAL EVERY 6 HOURS PRN
Status: CANCELLED | OUTPATIENT
Start: 2021-03-18

## 2021-03-18 RX ORDER — HYDROMORPHONE HYDROCHLORIDE 1 MG/ML
0.5 INJECTION, SOLUTION INTRAMUSCULAR; INTRAVENOUS; SUBCUTANEOUS
Status: DISCONTINUED | OUTPATIENT
Start: 2021-03-18 | End: 2021-03-18 | Stop reason: HOSPADM

## 2021-03-18 RX ORDER — HYDRALAZINE HYDROCHLORIDE 20 MG/ML
2.5-5 INJECTION INTRAMUSCULAR; INTRAVENOUS EVERY 10 MIN PRN
Status: DISCONTINUED | OUTPATIENT
Start: 2021-03-18 | End: 2021-03-18 | Stop reason: HOSPADM

## 2021-03-18 RX ORDER — OXYCODONE HYDROCHLORIDE 5 MG/1
5-10 TABLET ORAL
Qty: 30 TABLET | Refills: 0 | Status: SHIPPED | OUTPATIENT
Start: 2021-03-18

## 2021-03-18 RX ORDER — GABAPENTIN 100 MG/1
100 CAPSULE ORAL AT BEDTIME
Status: DISCONTINUED | OUTPATIENT
Start: 2021-03-18 | End: 2021-03-18 | Stop reason: HOSPADM

## 2021-03-18 RX ORDER — DEXAMETHASONE SODIUM PHOSPHATE 4 MG/ML
INJECTION, SOLUTION INTRA-ARTICULAR; INTRALESIONAL; INTRAMUSCULAR; INTRAVENOUS; SOFT TISSUE PRN
Status: DISCONTINUED | OUTPATIENT
Start: 2021-03-18 | End: 2021-03-18

## 2021-03-18 RX ORDER — DOCUSATE SODIUM 100 MG/1
100 CAPSULE, LIQUID FILLED ORAL 2 TIMES DAILY
Status: CANCELLED | OUTPATIENT
Start: 2021-03-18

## 2021-03-18 RX ORDER — ASPIRIN 81 MG/1
81 TABLET ORAL 2 TIMES DAILY
Status: CANCELLED | OUTPATIENT
Start: 2021-03-18

## 2021-03-18 RX ORDER — SODIUM CHLORIDE, SODIUM LACTATE, POTASSIUM CHLORIDE, CALCIUM CHLORIDE 600; 310; 30; 20 MG/100ML; MG/100ML; MG/100ML; MG/100ML
INJECTION, SOLUTION INTRAVENOUS CONTINUOUS
Status: DISCONTINUED | OUTPATIENT
Start: 2021-03-18 | End: 2021-03-18 | Stop reason: HOSPADM

## 2021-03-18 RX ORDER — TRANEXAMIC ACID 650 MG/1
1950 TABLET ORAL ONCE
Status: COMPLETED | OUTPATIENT
Start: 2021-03-18 | End: 2021-03-18

## 2021-03-18 RX ORDER — FLUMAZENIL 0.1 MG/ML
0.2 INJECTION, SOLUTION INTRAVENOUS
Status: DISCONTINUED | OUTPATIENT
Start: 2021-03-18 | End: 2021-03-18 | Stop reason: HOSPADM

## 2021-03-18 RX ORDER — NALOXONE HYDROCHLORIDE 0.4 MG/ML
0.2 INJECTION, SOLUTION INTRAMUSCULAR; INTRAVENOUS; SUBCUTANEOUS
Status: DISCONTINUED | OUTPATIENT
Start: 2021-03-18 | End: 2021-03-18 | Stop reason: HOSPADM

## 2021-03-18 RX ORDER — PROPOFOL 10 MG/ML
INJECTION, EMULSION INTRAVENOUS CONTINUOUS PRN
Status: DISCONTINUED | OUTPATIENT
Start: 2021-03-18 | End: 2021-03-18

## 2021-03-18 RX ORDER — NALOXONE HYDROCHLORIDE 0.4 MG/ML
0.4 INJECTION, SOLUTION INTRAMUSCULAR; INTRAVENOUS; SUBCUTANEOUS
Status: DISCONTINUED | OUTPATIENT
Start: 2021-03-18 | End: 2021-03-18 | Stop reason: HOSPADM

## 2021-03-18 RX ORDER — BUPIVACAINE HYDROCHLORIDE 2.5 MG/ML
INJECTION, SOLUTION EPIDURAL; INFILTRATION; INTRACAUDAL PRN
Status: DISCONTINUED | OUTPATIENT
Start: 2021-03-18 | End: 2021-03-18

## 2021-03-18 RX ORDER — OXYCODONE HYDROCHLORIDE 10 MG/1
10 TABLET ORAL EVERY 4 HOURS PRN
Status: DISCONTINUED | OUTPATIENT
Start: 2021-03-18 | End: 2021-03-18 | Stop reason: HOSPADM

## 2021-03-18 RX ORDER — METOPROLOL TARTRATE 1 MG/ML
1-2 INJECTION, SOLUTION INTRAVENOUS EVERY 5 MIN PRN
Status: DISCONTINUED | OUTPATIENT
Start: 2021-03-18 | End: 2021-03-18 | Stop reason: HOSPADM

## 2021-03-18 RX ORDER — BUPIVACAINE HYDROCHLORIDE 7.5 MG/ML
INJECTION, SOLUTION INTRASPINAL
Status: COMPLETED | OUTPATIENT
Start: 2021-03-18 | End: 2021-03-18

## 2021-03-18 RX ORDER — POLYETHYLENE GLYCOL 3350 17 G/17G
1 POWDER, FOR SOLUTION ORAL DAILY
Qty: 7 PACKET | Refills: 0 | Status: SHIPPED | OUTPATIENT
Start: 2021-03-18

## 2021-03-18 RX ORDER — DEXAMETHASONE SODIUM PHOSPHATE 10 MG/ML
INJECTION, SOLUTION INTRAMUSCULAR; INTRAVENOUS PRN
Status: DISCONTINUED | OUTPATIENT
Start: 2021-03-18 | End: 2021-03-18

## 2021-03-18 RX ORDER — CEFAZOLIN SODIUM 2 G/100ML
2 INJECTION, SOLUTION INTRAVENOUS
Status: COMPLETED | OUTPATIENT
Start: 2021-03-18 | End: 2021-03-18

## 2021-03-18 RX ORDER — HYDROXYZINE HYDROCHLORIDE 10 MG/1
10 TABLET, FILM COATED ORAL EVERY 6 HOURS PRN
Qty: 30 TABLET | Refills: 0 | Status: SHIPPED | OUTPATIENT
Start: 2021-03-18

## 2021-03-18 RX ORDER — CEFAZOLIN SODIUM 2 G/100ML
2 INJECTION, SOLUTION INTRAVENOUS EVERY 8 HOURS
Status: CANCELLED | OUTPATIENT
Start: 2021-03-18 | End: 2021-03-19

## 2021-03-18 RX ORDER — FENTANYL CITRATE 50 UG/ML
25-50 INJECTION, SOLUTION INTRAMUSCULAR; INTRAVENOUS
Status: DISCONTINUED | OUTPATIENT
Start: 2021-03-18 | End: 2021-03-18 | Stop reason: HOSPADM

## 2021-03-18 RX ORDER — GABAPENTIN 300 MG/1
CAPSULE ORAL
Qty: 45 CAPSULE | Refills: 0 | Status: SHIPPED | OUTPATIENT
Start: 2021-03-18 | End: 2021-05-10

## 2021-03-18 RX ORDER — CEFAZOLIN SODIUM 1 G/3ML
1 INJECTION, POWDER, FOR SOLUTION INTRAMUSCULAR; INTRAVENOUS SEE ADMIN INSTRUCTIONS
Status: DISCONTINUED | OUTPATIENT
Start: 2021-03-18 | End: 2021-03-18 | Stop reason: HOSPADM

## 2021-03-18 RX ORDER — AMOXICILLIN 250 MG
1 CAPSULE ORAL 2 TIMES DAILY
Status: CANCELLED | OUTPATIENT
Start: 2021-03-18

## 2021-03-18 RX ORDER — APREPITANT 40 MG/1
40 CAPSULE ORAL
Status: COMPLETED | OUTPATIENT
Start: 2021-03-18 | End: 2021-03-18

## 2021-03-18 RX ORDER — OXYCODONE HYDROCHLORIDE 5 MG/1
5 TABLET ORAL EVERY 4 HOURS PRN
Status: DISCONTINUED | OUTPATIENT
Start: 2021-03-18 | End: 2021-03-18 | Stop reason: HOSPADM

## 2021-03-18 RX ORDER — ACETAMINOPHEN 325 MG/1
650 TABLET ORAL EVERY 4 HOURS PRN
Qty: 100 TABLET | Refills: 0 | Status: SHIPPED | OUTPATIENT
Start: 2021-03-18

## 2021-03-18 RX ORDER — ONDANSETRON 2 MG/ML
4 INJECTION INTRAMUSCULAR; INTRAVENOUS EVERY 30 MIN PRN
Status: DISCONTINUED | OUTPATIENT
Start: 2021-03-18 | End: 2021-03-18 | Stop reason: HOSPADM

## 2021-03-18 RX ORDER — SODIUM CHLORIDE, SODIUM LACTATE, POTASSIUM CHLORIDE, CALCIUM CHLORIDE 600; 310; 30; 20 MG/100ML; MG/100ML; MG/100ML; MG/100ML
INJECTION, SOLUTION INTRAVENOUS CONTINUOUS
Status: CANCELLED | OUTPATIENT
Start: 2021-03-18

## 2021-03-18 RX ORDER — LIDOCAINE HYDROCHLORIDE 20 MG/ML
INJECTION, SOLUTION INFILTRATION; PERINEURAL PRN
Status: DISCONTINUED | OUTPATIENT
Start: 2021-03-18 | End: 2021-03-18

## 2021-03-18 RX ORDER — METHOCARBAMOL 500 MG/1
500 TABLET, FILM COATED ORAL EVERY 6 HOURS PRN
Status: DISCONTINUED | OUTPATIENT
Start: 2021-03-18 | End: 2021-03-18 | Stop reason: HOSPADM

## 2021-03-18 RX ORDER — MELOXICAM 15 MG/1
15 TABLET ORAL DAILY
Qty: 30 TABLET | Refills: 0 | Status: SHIPPED | OUTPATIENT
Start: 2021-03-18

## 2021-03-18 RX ORDER — ACETAMINOPHEN 325 MG/1
975 TABLET ORAL EVERY 8 HOURS
Status: DISCONTINUED | OUTPATIENT
Start: 2021-03-18 | End: 2021-03-18 | Stop reason: HOSPADM

## 2021-03-18 RX ORDER — SODIUM CHLORIDE, SODIUM LACTATE, POTASSIUM CHLORIDE, CALCIUM CHLORIDE 600; 310; 30; 20 MG/100ML; MG/100ML; MG/100ML; MG/100ML
INJECTION, SOLUTION INTRAVENOUS CONTINUOUS PRN
Status: DISCONTINUED | OUTPATIENT
Start: 2021-03-18 | End: 2021-03-18

## 2021-03-18 RX ORDER — DIMENHYDRINATE 50 MG/ML
25 INJECTION, SOLUTION INTRAMUSCULAR; INTRAVENOUS
Status: DISCONTINUED | OUTPATIENT
Start: 2021-03-18 | End: 2021-03-18 | Stop reason: HOSPADM

## 2021-03-18 RX ORDER — HYDROMORPHONE HYDROCHLORIDE 1 MG/ML
.3-.5 INJECTION, SOLUTION INTRAMUSCULAR; INTRAVENOUS; SUBCUTANEOUS EVERY 5 MIN PRN
Status: DISCONTINUED | OUTPATIENT
Start: 2021-03-18 | End: 2021-03-18 | Stop reason: HOSPADM

## 2021-03-18 RX ORDER — SCOLOPAMINE TRANSDERMAL SYSTEM 1 MG/1
1 PATCH, EXTENDED RELEASE TRANSDERMAL ONCE
Status: DISCONTINUED | OUTPATIENT
Start: 2021-03-18 | End: 2021-03-18 | Stop reason: HOSPADM

## 2021-03-18 RX ORDER — EPHEDRINE SULFATE 50 MG/ML
INJECTION, SOLUTION INTRAVENOUS PRN
Status: DISCONTINUED | OUTPATIENT
Start: 2021-03-18 | End: 2021-03-18

## 2021-03-18 RX ORDER — MAGNESIUM HYDROXIDE 1200 MG/15ML
LIQUID ORAL PRN
Status: DISCONTINUED | OUTPATIENT
Start: 2021-03-18 | End: 2021-03-18 | Stop reason: HOSPADM

## 2021-03-18 RX ORDER — ONDANSETRON 2 MG/ML
4 INJECTION INTRAMUSCULAR; INTRAVENOUS EVERY 6 HOURS PRN
Status: CANCELLED | OUTPATIENT
Start: 2021-03-18

## 2021-03-18 RX ORDER — CEFAZOLIN SODIUM 1 G/3ML
INJECTION, POWDER, FOR SOLUTION INTRAMUSCULAR; INTRAVENOUS PRN
Status: DISCONTINUED | OUTPATIENT
Start: 2021-03-18 | End: 2021-03-18

## 2021-03-18 RX ORDER — ACETAMINOPHEN 325 MG/1
650 TABLET ORAL EVERY 4 HOURS PRN
Status: DISCONTINUED | OUTPATIENT
Start: 2021-03-21 | End: 2021-03-18 | Stop reason: HOSPADM

## 2021-03-18 RX ORDER — LIDOCAINE 40 MG/G
CREAM TOPICAL
Status: CANCELLED | OUTPATIENT
Start: 2021-03-18

## 2021-03-18 RX ORDER — LIDOCAINE 40 MG/G
CREAM TOPICAL
Status: DISCONTINUED | OUTPATIENT
Start: 2021-03-18 | End: 2021-03-18 | Stop reason: HOSPADM

## 2021-03-18 RX ORDER — ONDANSETRON 2 MG/ML
INJECTION INTRAMUSCULAR; INTRAVENOUS PRN
Status: DISCONTINUED | OUTPATIENT
Start: 2021-03-18 | End: 2021-03-18

## 2021-03-18 RX ORDER — NAPROXEN 250 MG/1
250 TABLET ORAL EVERY 12 HOURS PRN
Status: DISCONTINUED | OUTPATIENT
Start: 2021-03-18 | End: 2021-03-18 | Stop reason: HOSPADM

## 2021-03-18 RX ORDER — ASPIRIN 81 MG/1
81 TABLET ORAL 2 TIMES DAILY
Qty: 60 TABLET | Refills: 0 | Status: SHIPPED | OUTPATIENT
Start: 2021-03-18

## 2021-03-18 RX ORDER — POLYETHYLENE GLYCOL 3350 17 G/17G
17 POWDER, FOR SOLUTION ORAL DAILY
Status: CANCELLED | OUTPATIENT
Start: 2021-03-19

## 2021-03-18 RX ORDER — BISACODYL 10 MG
10 SUPPOSITORY, RECTAL RECTAL DAILY PRN
Status: CANCELLED | OUTPATIENT
Start: 2021-03-18

## 2021-03-18 RX ORDER — ONDANSETRON 4 MG/1
4 TABLET, ORALLY DISINTEGRATING ORAL EVERY 6 HOURS PRN
Status: CANCELLED | OUTPATIENT
Start: 2021-03-18

## 2021-03-18 RX ADMIN — MIDAZOLAM 2 MG: 1 INJECTION INTRAMUSCULAR; INTRAVENOUS at 10:19

## 2021-03-18 RX ADMIN — EPHEDRINE SULFATE 5 MG: 50 INJECTION, SOLUTION INTRAVENOUS at 11:01

## 2021-03-18 RX ADMIN — PROPOFOL: 10 INJECTION, EMULSION INTRAVENOUS at 11:17

## 2021-03-18 RX ADMIN — DEXAMETHASONE SODIUM PHOSPHATE 2 MG: 10 INJECTION, SOLUTION INTRAMUSCULAR; INTRAVENOUS at 09:55

## 2021-03-18 RX ADMIN — BUPIVACAINE HYDROCHLORIDE IN DEXTROSE 1.6 ML: 7.5 INJECTION, SOLUTION SUBARACHNOID at 10:27

## 2021-03-18 RX ADMIN — CEFAZOLIN 2 G: 10 INJECTION, POWDER, FOR SOLUTION INTRAVENOUS at 10:25

## 2021-03-18 RX ADMIN — PHENYLEPHRINE HYDROCHLORIDE 100 MCG: 10 INJECTION INTRAVENOUS at 11:01

## 2021-03-18 RX ADMIN — PHENYLEPHRINE HYDROCHLORIDE 100 MCG: 10 INJECTION INTRAVENOUS at 10:41

## 2021-03-18 RX ADMIN — PROPOFOL: 10 INJECTION, EMULSION INTRAVENOUS at 11:49

## 2021-03-18 RX ADMIN — DEXMEDETOMIDINE HYDROCHLORIDE 20 MCG: 100 INJECTION, SOLUTION INTRAVENOUS at 09:55

## 2021-03-18 RX ADMIN — PHENYLEPHRINE HYDROCHLORIDE: 10 INJECTION INTRAVENOUS at 11:15

## 2021-03-18 RX ADMIN — LIDOCAINE HYDROCHLORIDE 60 MG: 20 INJECTION, SOLUTION INFILTRATION; PERINEURAL at 10:32

## 2021-03-18 RX ADMIN — EPHEDRINE SULFATE 5 MG: 50 INJECTION, SOLUTION INTRAVENOUS at 12:22

## 2021-03-18 RX ADMIN — SODIUM CHLORIDE, POTASSIUM CHLORIDE, SODIUM LACTATE AND CALCIUM CHLORIDE: 600; 310; 30; 20 INJECTION, SOLUTION INTRAVENOUS at 10:19

## 2021-03-18 RX ADMIN — PHENYLEPHRINE HYDROCHLORIDE 0.3 MCG/KG/MIN: 10 INJECTION INTRAVENOUS at 10:57

## 2021-03-18 RX ADMIN — MIDAZOLAM 1 MG: 1 INJECTION INTRAMUSCULAR; INTRAVENOUS at 09:55

## 2021-03-18 RX ADMIN — PHENYLEPHRINE HYDROCHLORIDE: 10 INJECTION INTRAVENOUS at 11:43

## 2021-03-18 RX ADMIN — TRANEXAMIC ACID 1950 MG: 650 TABLET ORAL at 08:34

## 2021-03-18 RX ADMIN — PROPOFOL 100 MCG/KG/MIN: 10 INJECTION, EMULSION INTRAVENOUS at 10:32

## 2021-03-18 RX ADMIN — SCOPALAMINE 1 PATCH: 1 PATCH, EXTENDED RELEASE TRANSDERMAL at 09:25

## 2021-03-18 RX ADMIN — CEFAZOLIN 1 G: 1 INJECTION, POWDER, FOR SOLUTION INTRAMUSCULAR; INTRAVENOUS at 12:28

## 2021-03-18 RX ADMIN — PHENYLEPHRINE HYDROCHLORIDE 100 MCG: 10 INJECTION INTRAVENOUS at 10:52

## 2021-03-18 RX ADMIN — APREPITANT 40 MG: 40 CAPSULE ORAL at 09:34

## 2021-03-18 RX ADMIN — PHENYLEPHRINE HYDROCHLORIDE 100 MCG: 10 INJECTION INTRAVENOUS at 12:22

## 2021-03-18 RX ADMIN — PHENYLEPHRINE HYDROCHLORIDE 100 MCG: 10 INJECTION INTRAVENOUS at 10:49

## 2021-03-18 RX ADMIN — ONDANSETRON 4 MG: 2 INJECTION INTRAMUSCULAR; INTRAVENOUS at 12:59

## 2021-03-18 RX ADMIN — DEXAMETHASONE SODIUM PHOSPHATE 4 MG: 4 INJECTION, SOLUTION INTRAMUSCULAR; INTRAVENOUS at 10:48

## 2021-03-18 RX ADMIN — SODIUM CHLORIDE, POTASSIUM CHLORIDE, SODIUM LACTATE AND CALCIUM CHLORIDE: 600; 310; 30; 20 INJECTION, SOLUTION INTRAVENOUS at 12:30

## 2021-03-18 RX ADMIN — BUPIVACAINE HYDROCHLORIDE 20 ML: 2.5 INJECTION, SOLUTION EPIDURAL; INFILTRATION; INTRACAUDAL at 09:55

## 2021-03-18 ASSESSMENT — LIFESTYLE VARIABLES: TOBACCO_USE: 0

## 2021-03-18 ASSESSMENT — MIFFLIN-ST. JEOR: SCORE: 1770.13

## 2021-03-18 NOTE — PROGRESS NOTES
Care Management Follow Up    Length of Stay (days): 0    Additional Information:    Referral sent to Centralized Scheduling to set up outpatient physical therapy. [facility] did not see patient. Patient discharged from the PACU.      Ashtyn Pradhan RN, BSN  Care Coordinator, 5 Ortho  Phone [phone]  Pager [phone]

## 2021-03-18 NOTE — ANESTHESIA PREPROCEDURE EVALUATION
Anesthesia Pre-Procedure Evaluation    Patient: Shankar Armenta   MRN: [id number] : 1955        Preoperative Diagnosis: * No surgery found *   Procedure :      Past Medical History:   Diagnosis Date     Arthritis     osteoarthritis     Coronary artery disease      Diabetes (H)      Hypertension      PONV (postoperative nausea and vomiting)      Stented coronary artery 2019      Past Surgical History:   Procedure Laterality Date     ARTHROPLASTY KNEE Left 2016     ARTHROSCOPY KNEE       BIOPSY      cecum     COLONOSCOPY        Allergies   Allergen Reactions     Fentanyl Nausea     Oxycodone Nausea and Vomiting      Social History     Tobacco Use     Smoking status: Never Smoker     Smokeless tobacco: Never Used   Substance Use Topics     Alcohol use: Not Currently      Wt Readings from Last 1 Encounters:   21 96.8 kg (213 lb 6.5 oz)        Anesthesia Evaluation   Pt has had prior anesthetic. Type: Regional.    History of anesthetic complications  - PONV.      ROS/MED HX  ENT/Pulmonary:  - neg pulmonary ROS  (-) tobacco use   Neurologic:  - neg neurologic ROS     Cardiovascular:     (+) hypertension--CAD --stent-. Taking blood thinners Previous cardiac testing   Echo: Date: 2019 Results:  Interpretation Summary    * The left ventricular systolic function is normal, estimated LVEF 60-65%.    * Asymmetric septal hypertrophy likely representing sigmoid septum (septum   1.5 cm, posterior wall 0.96 cm).     * Abnormal septal motion secondary to conduction abnormality.     * Chordal KB present (no LVOT obstruction, peak gradient 5 mmHg).     * No prior study . Consider further cardiac evaluation for HCM given   clinical indication.     Stress Test:  Date: Results:    ECG Reviewed:  Date: 2019 Results:  SR with first degree AV block  Cath:  Date: 2019 Results:  Conclusions     1. 99% distal circumflex stenosis.     2. COMMENTS:  discussed results with Dr. Mccollum, the referring   cardiologist.   Following the discussion, recommendation was to proceed with   PCI.       METS/Exercise Tolerance: >4 METS Comment: Can ascend stairs and walk 1 mile with a brace   Hematologic:  - neg hematologic  ROS  (-) history of blood transfusion   Musculoskeletal: Comment: S/p L TKA  (+) arthritis,     GI/Hepatic:     (+) GERD (intermittent, uses TUMS PRN), Asymptomatic on medication,     Renal/Genitourinary:  - neg Renal ROS     Endo:     (+) type II DM, Last HgA1c: 7.3, date: 2019, Not using insulin, - not using insulin pump. Normal glucose range: 140-160,     Psychiatric/Substance Use:  - neg psychiatric ROS     Infectious Disease:  - neg infectious disease ROS     Malignancy:  - neg malignancy ROS     Other:            Physical Exam    Airway        Mallampati: II   TM distance: > 3 FB   Neck ROM: full   Mouth opening: > 3 cm    Respiratory Devices and Support         Dental  no notable dental history     (+) missing      Cardiovascular   cardiovascular exam normal       Rhythm and rate: regular and normal     Pulmonary   pulmonary exam normal        breath sounds clear to auscultation           OUTSIDE LABS:  CBC:   Lab Results   Component Value Date    WBC 8.3 03/03/2021    HGB 15.8 03/03/2021    HCT 46.0 03/03/2021     03/03/2021     BMP:   Lab Results   Component Value Date     03/03/2021    POTASSIUM 3.9 03/03/2021    CHLORIDE 102 03/03/2021    CO2 27 03/03/2021    BUN 22 03/03/2021    CR 0.96 03/03/2021     (H) 03/03/2021     COAGS: No results found for: PTT, INR, FIBR  POC:   Lab Results   Component Value Date     (H) 03/18/2021     HEPATIC: No results found for: ALBUMIN, PROTTOTAL, ALT, AST, GGT, ALKPHOS, BILITOTAL, BILIDIRECT, CHELA  OTHER:   Lab Results   Component Value Date    A1C 7.3 (H) 03/03/2021    ANABELA 9.4 03/03/2021       Anesthesia Plan    ASA Status:  3      Anesthesia Type: Spinal.   Induction: Intravenous.   Maintenance: TIVA.        Consents    Anesthesia Plan(s) and  associated risks, benefits, and realistic alternatives discussed. Questions answered and patient/representative(s) expressed understanding.     - Discussed with:  Patient      - Extended Intubation/Ventilatory Support Discussed: No.      - Patient is DNR/DNI Status: No    Use of blood products discussed: Yes.     - Discussed with: Patient.     - Consented: consented to blood products     Postoperative Care    Pain management: IV analgesics, Oral pain medications, Multi-modal analgesia, Peripheral nerve block (Single Shot).   PONV prophylaxis: Ondansetron (or other 5HT-3), Dexamethasone or Solumedrol, Background Propofol Infusion, Scopolamine patch     Comments:           Prior to Admission Medications   Prescriptions Last Dose Informant Patient Reported? Taking?   Omega-3 Fatty Acids (OMEGA-3 FISH OIL PO) 3/10/2021  Yes No   Sig: Take 2 g by mouth every morning    amoxicillin (AMOXIL) 500 MG capsule More than a month at Unknown time  No No   Sig: TAKE 4 CAPSULES BY MOUTHX1 DOSE 1 HOUR BEFORE DENTAL.   aspirin 81 MG tablet 3/10/2021  Yes No   Sig: Take 81 mg by mouth every morning    atorvastatin (LIPITOR) 40 MG tablet 3/18/2021 at 0700  Yes Yes   Sig: Take 40 mg by mouth every morning    chlorthalidone (HYGROTON) 25 MG tablet 3/17/2021 at 0800  Yes Yes   Sig: Take 12.5 mg by mouth every evening    metFORMIN (GLUCOPHAGE-XR) 500 MG 24 hr tablet 3/17/2021 at 1800  Yes Yes   Si times daily (with meals)    metoprolol succinate ER (TOPROL-XL) 25 MG 24 hr tablet 3/17/2021 at 2300  Yes Yes   Sig: Take 25 mg by mouth every evening    nitroGLYcerin (NITROSTAT) 0.4 MG sublingual tablet Unknown at Unknown time  Yes No      Facility-Administered Medications: None          PAC Discussion and Assessment    ASA Classification: 3  Case is suitable for: [facility]  PAC Recommendations anesthetic techniques: choice.                  PAC Resident/NP Anesthesia Assessment: Shankar Armenta is a 65 year old male scheduled for R TKA on  3/18/21 by Dr. Marcial in treatment of OA.  PAC referral for risk assessment and optimization for anesthesia with comorbid conditions of hypertension, CAD s/p stent in 2019, diabetes:        Pre-operative considerations:    1.  Cardiac:  Functional status- METS >4- patient states he walks about 1 mile and can ascend stairs without LEACH. h/o CAD s/p PCI 1/2019. Denies any cardiac symptoms since that time.  hypertension using metoprolol and chlorthalidone. dyslipidemia using Lipitor. Previous cardiac testing as above. Will repeat EKG prior to surgery.   intermediate risk surgery with 0.9% (RCRI #) risk of major adverse cardiac event.        2.  Pulm:  TORIE risk: intermediate. non smoker. denies pulmonary symptoms. COVID testing per surgery team.         3.  GI:  Risk of PONV score = 2.  If > 2, anti-emetic intervention recommended.        4.  Endo: diabetes using metformin. A1c in 2019 was 7.3. Patient states glucose runs 140-160.  Will repeat prior to surgery.       5. Msk: h/o L TKA. now with right knee pain and the above procedure planned.         VTE risk: 1.8%        Patient is optimized and is acceptable candidate for the proposed procedure, pending lab results and EKG results.    Patient discussed with Dr. Burr    **Physical exam and vital signs not completed today as this visit was scheduled as a virtual visit during Covid 19 pandemic. Physical exam should be completed the DOS in pre-op**    Addendum: lab and EKG results available. Stable and okay for surgery.    **For further details of assessment and testing please see H and P completed on same date.            MONO Lang MD

## 2021-03-18 NOTE — DISCHARGE INSTRUCTIONS
Caring for Your Incision  Same-Day Surgery   Adult Discharge Orders & Instructions     For 24 hours after surgery:  1. Get plenty of rest.  A responsible adult must stay with you for at least 24 hours after you leave the hospital.   2. Pain medication can slow your reflexes. Do not drive or use heavy equipment.  If you have weakness or tingling, don't drive or use heavy equipment until this feeling goes away.  3. Mixing alcohol and pain medication can cause dizziness and slow your breathing. It can even be fatal. Do not drink alcohol while taking pain medication.  4. Avoid strenuous or risky activities.  Ask for help when climbing stairs.   5. You may feel lightheaded.  If so, sit for a few minutes before standing.  Have someone help you get up.   6. If you have nausea (feel sick to your stomach), drink only clear liquids such as apple juice, ginger ale, broth or 7-Up.  Rest may also help.  Be sure to drink enough fluids.  Move to a regular diet as you feel able. Take pain medications with a small amount of solid food, such as toast or crackers, to avoid nausea.   7. A slight fever is normal. Call the doctor if your fever is over 100 F (37.7 C) (taken under the tongue) or lasts longer than 24 hours.  8. You may have a dry mouth, muscle aches, trouble sleeping or a sore throat.  These symptoms should go away after 24 hours.  9. Do not make important or legal decisions.   Pain Management:      1. Take pain medication (if prescribed) for pain as directed by your physician.        2. WARNING: If the pain medication you have been prescribed contains Tylenol  (acetaminophen), DO NOT take additional doses of Tylenol (acetaminophen).     Call your doctor for any of the followin.  Signs of infection (fever, growing tenderness at the surgery site, severe pain, a large amount of drainage or bleeding, foul-smelling drainage, redness, swelling).    2.  It has been over 8 to 10 hours since surgery and you are still not able  to urinate (pee).    3.  Headache for over 24 hours.    4.  Numbness, tingling or weakness the day after surgery (if you had spinal anesthesia).  To contact a doctor, call _____________________________________ or:      [phone] and ask for the Resident On Call for:          __________________________________________ (answered 24 hours a day)      Emergency Department:  [facility]: [phone]  [facility]: [phone]               Rev. 10/2014   Scopolamine Patch  (Absorbed through the skin)    The Scopolamine Patch prevents nausea and vomiting caused by motion sickness or anesthesia and surgery in adults.    Brand Name(s): Transderm Scop, Transderm-Scope  There may be other brand names for this medicine.    When This Medicine Should Not Be Used:  You should not use this medicine if you have had an allergic reaction to scopolamine, or if you have narrow angle glaucoma.    How to Use This Medicine:    Your doctor will tell you how many patches to use, where to apply them, and how often to apply them.     Do not use more patches or apply them more often than your doctor tells you to.    This medicine comes with patient instructions. Read and follow these instructions carefully. Ask your doctor or pharmacist if you have any questions.    To prevent motion sickness, apply the patch at least 4 hours before you need it.    Wash and dry your hands thoroughly before applying the patch.    Leave the patch in its sealed wrapper until you are ready to put it on. Tear the wrapper open carefully. NEVER CUT the wrapper or the patch with scissors. Do not use any patch that has been cut by accident.    Take the liner off the sticky side before applying.    Apply the patch to dry, hairless skin behind the ear.    If the patch is loose or falls off, apply a new patch at a different place behind the ear.    After you take off the patch, wash the place where the patch was and your hands  thoroughly.    Only one patch should be used at any time.    If a dose is missed:  If you forget to wear or change a patch, put one on as soon as you can. If it is almost time to put on your next patch, wait until then to apply a new patch and skip the one you missed. Do not apply extra patches to make up for a missed dose.    How to Store and Dispose of This Medicine:    Store the patches at room temperature in a closed container, away from heat, moisture and direct light.    Fold the used patch in half with the sticky sides together. Throw any used patch away so that children or pets cannot get to it. You will also need to throw away old patches after the expiration date has passed.    Keep all medicine away from children and never share your medicine with anyone.    Ask your doctor or pharmacist before using any other medicine, including over-the-counter medicines, vitamins, and herbal products.  Tell your doctor if you are using any medicines that make you sleepy. These include sleeping pills, cold and allergy medicine, narcotic pain relievers and sedatives.     Tell your doctor if you are using any medicine that make you sleepy. These include sleeping pills, jassi and allergy medicine, narcotic pain relievers and sedatives.    Do not drink alcohol while you are using this medicine.     Warnings While Using This Medicine:    Make sure your doctor knows if you are pregnant or breastfeeding or if you have glaucoma, prostate problems, trouble urinating, blocked bowels, liver disease, kidney disease or a history of seizures or mental illness.    This medicine can cause blurring of vision and other vision problems if it comes in contact with the eyes. This medicine may also cause problems with urination. If any of these reactions occur, remove the patch and call your doctor right away.    This medicine may make you dizzy or drowsy. Avoid driving, using machines, or doing anything else that could be dangerous if you  are not alert. If you plan to participate in underwater sports, this medicine may cause disorienting effects. If this is a concern for you, talk with your doctor.    This medicine may make you sweat less and cause your body to get too hot. Be careful in hot weather, when you are exercising or if using sauna or whirlpool.    Make sure any doctor or dentist who treats you knows that you are using this medicine. This medicine may affect the results of certain medical tests.    Skin burns have been reported at the patch site in several patients wearing an aluminized transdermal system during a magnetic resonance imaging scan (MRI). Because Transderm Scop contains aluminum, it is recommended to remove the system before undergoing an MRI.    Call your doctor right away if you notice any of these side effects:    Allergic reaction: Itching or hives, swelling in your face or hands, swelling or tingling in your mouth or throat, chest tightness, trouble breathing    Blurred vision    Confusion or memory loss    Fast, slow or uneven heartbeat    Lightheadedness, dizziness, drowsiness or fainting    Seeing, hearing or feeling things that are not there    Severe eye pain    Trouble urinating    If you notice these less serious side effects, talk with your doctor:    Dry mouth    Dry, itchy or red eyes    Restlessness    Skin rash or redness    If you notice other side effects that you think are caused by this medicine, tell your doctor immediately.    Rev. 4/2014        You ll need to care for your incision after surgery and certain medical procedures. To close an incision, your doctor used sutures (stitches), steri-strips, staples, or dermabond. Follow the tips on this sheet to help heal and prevent infection of your incision.   Types of Incision Closure:    Surgical Sutures (stitches) are placed by sewing the edges of an incision together with surgical thread. Sutures are either absorbable or non-absorbable. Absorbable sutures  break down in the body over time. Non-absorbable sutures need to be removed.     Steri-strips are made of adhesive (sticky) material to help hold the edges of an incision together. Steri-strips usually fall off by themselves in 7 to 10 days.     Surgical Staples are made or steel or titanium. They are often used to close shallow incisions. They are not used on certain body areas, such as the face and hands. This is because these areas have nerves that are close to the surface. Staples are usually removed within a week.     Dermabond (skin glue) is used to close a cut or small incision. The skin glue is less painful than stitches (sutures). In some cases, a lower layer of skin may be sutured before Dermabond is applied. The skin glue closes the cut/incision within a few minutes. It also provides a water-resistant covering. No bandage is required. Dermabond peels off on its own within 5 to 10 days.  Home Care for Your  Incision:    Keep the incision clean and dry. You should bathe only as directed by the doctor. It is okay to wash around the incision, but don t spray water directly on it.     Check the incision site daily for pain, redness, drainage, swelling, or separation of the incision edges.     If you have a dressing over the incision, change the dressing as directed by the doctor.    Make sure any clothing that touches the incision is loose fitting. This will prevent rubbing. If the incision is on the head, avoid wearing caps or other head coverings. These may rub against the incision.    Avoid rough play, contact sports, or physical activities. This can put you at risk of opening an incision.     As your incision heals, the skin may appear pink or red. It may also feel slightly bumpy or raised. This is called a healing ridge. Over time, the color should fade and the raised skin will become less noticeable.   Call the doctor right away if you have any of the following:    Increased pain, redness, drainage,  swelling or bleeding at the incision site    Numbness, coldness or tingling around the incision site    Fever of 101 F degrees or higher  Rev. 4/2014    After Knee Replacement: Back at Home  You and your healthcare team will assess how well you can care for yourself at home. You may need friends, family, or a home health aide to help with chores and errands. Your occupational or physical therapist will teach you the skills needed for daily living with your new knee.   Home safe home  Is your home as safe as it should be? Or, are there potential hazards, like rugs and cords, ready to trip you up? Make sure your home is safe and free of hazards before you return. Ask friends or family to help you rearrange rooms as needed. Tips:     Remove throw rugs to prevent slipping or tripping on them.    Move electrical cords out of the way.    Remove any clutter from the floor such as books or shoes.    Install a rail along one side of staircases.    Place items used regularly in cabinets that can be reached without using a step stool.  Bathroom safety     Your doctor may advise that you use a toilet seat riser. This raises the height of the toilet seat.     You may need to adjust your bathroom to make it safer and easier to use. Your occupational therapist can help you choose the right equipment for your bathroom. He or she will also teach you to bathe, dress, and sit more easily in the bathroom.   Tips:    Use a long-handled sponge to wash hard-to-reach areas.    Use a rubber-backed bath mat to help keep the floor dry and prevent slipping.    Sit on a shower chair while you bathe.    Use commode chair or elevated toilet seat if your doctor advises you to. This device raises the height of your toilet.  Managing pain at home  You may be prescribed pain medicine to use at home. With pain under control, you ll get back to an active life sooner. Use pain medicine only as directed. Take each dose on schedule, before pain becomes  severe. Don t hesitate to take medicine when you need it. Wait about 30 minutes after taking pain medicine before starting an activity, such as exercise. Tell your healthcare provider if the medicine doesn t control your pain or if you suddenly feel worse. Icing and elevating your leg can also help relieve pain.   Jimmy Fairly last reviewed this educational content on 11/1/2019 2000-2020 [employer]. All rights reserved. This information is not intended as a substitute for professional medical care. Always follow your healthcare professional's instructions.    After Knee Replacement: Managing Pain at Home    You may be prescribed pain medicine to use at home. With pain under control, you ll get back to an active life sooner. Use pain medicine only as directed. You may also be instructed to take over-the-counter pain medicines that don t need a prescription. You may be directed to use these with, or instead of, your prescription medicines. Take each dose on schedule, before pain becomes severe. Wait about 30 to 60 minutes after taking pain medicine before starting an activity, such as exercise. This will give it time to start working. Tell your healthcare provider if the medicine doesn t control your pain enough or if you suddenly feel worse. Icing and raising your leg can also help relieve pain. Ask your provider about possible side effects such as drowsiness, constipation or becoming dependent on the medicine.  Your surgeon may also recommend one or more of various nonmedication treatments for your pain.  These may include:     Relaxation techniques    Massage therapy    Acupuncture    Transcutaneous electrical nerve stimulation (a TENS unit)    Continuous passive motion  Often a combination of medicine and non-medicine therapy works best.  Jimmy Fairly last reviewed this educational content on 6/1/2018 2000-2020 [employer]. All rights reserved. This information is not intended as a  substitute for professional medical care. Always follow your healthcare professional's instructions.        After Knee Replacement: Controlling Swelling  Swelling is common after total knee replacement. It may be worse after exercise. To help control swelling, follow the steps below.  Ice your knee  Wrap an ice pack or bag of frozen peas in a thin cloth, then place it on your knee. Don t use ice for more than 20 minutes at a time. If you have an ice machine, use it as directed.  Elevate your leg  Elevate your leg above your heart. Ask your healthcare provider about safe positions to do this.  Do ankle pumps  Continue doing ankle pumps. They help reduce swelling, improve circulation, and prevent blood clots. Point, then flex both feet slowly. Repeat this 10 to 30 times each hour.    [employer] last reviewed this educational content on 1/1/2018 2000-2020 [employer]. All rights reserved. This information is not intended as a substitute for professional medical care. Always follow your healthcare professional's instructions.        After Knee Replacement: Keeping Your Knee Healthy  You can keep your knee healthy by knowing the right moves and not doing the wrong ones. Some activities could harm your artificial knee and may be permanently restricted. Your healthcare provider and physical therapist will give you specific instructions and advice.      Take small steps to move your body.   Do s:    Place your knee comfortably as you go about daily activities.    Exercise and walk every day as directed by your healthcare provider.    Use an ice pack if your knee starts to swell or feel tender.    Don ts:    Don't twist your knee. Turn your whole body instead.    Don't jump or do any impact activity. It could loosen your new knee joint.    Don't force movements, such as bending your knee too far.    Don't put a pillow behind your knee when you are resting. This may keep you from straightening it  fully.     Follow-up care  Your orthopedic surgeon will schedule follow-up exams to make sure that your knee is healing the right way. Use this time to ask any questions you have about your recovery or activities.   [employer] last reviewed this educational content on 11/1/2019 2000-2020 [employer]. All rights reserved. This information is not intended as a substitute for professional medical care. Always follow your healthcare professional's instructions.        After Knee Replacement: Using a Walker  Once you can stand, you ll start using a walker. There are 3 main types of walkers:    Standard without wheels    2-wheeled (front) rolling walker    4-wheeled rolling walker  Your physical therapist or another member of your healthcare team will help you select the best walker for you. As you become better at using the walker and your knee strengthens, you ll be taught more advanced skills. For instance, you may practice stepping on and off a curb.    Your first steps    Push your walker a few inches in front of you.    Keeping your back straight, lean on the walker so it supports your weight. Step into the center of the walker with your operated leg, being careful not to twist your leg. Then, step with your other leg.    As you get more comfortable using the walker, you may be able to move it as you step.  Walking up a curb    Move your feet and the walker as close to the curb as possible.    Put your weight on both your legs, then lift the walker onto the curb.    Step up with the un-operated leg. Using the walker to support your weight, bring up the operated leg.  Walking down a curb      Move your feet and the walker as close to the edge of the curb as you safely can.    Lower the walker onto the ground, keeping its back legs against the curb.    Using the walker to support your weight, lower the operated leg. Then step down with the other leg.  [employer] last reviewed this educational content on  3/1/2018    6946-8577 [employer]. All rights reserved. This information is not intended as a substitute for professional medical care. Always follow your healthcare professional's instructions.

## 2021-03-18 NOTE — ANESTHESIA PROCEDURE NOTES
Adductor canal Procedure Note  Pre-Procedure   Staff -        Anesthesiologist:  Kasi Rosario MD       Performed By: anesthesiologist       Location: pre-op       Pre-Anesthestic Checklist: patient identified, IV checked, site marked, risks and benefits discussed, informed consent, monitors and equipment checked, pre-op evaluation, at physician/surgeon's request and post-op pain management  Timeout:       Correct Patient: Yes        Correct Procedure: Yes        Correct Site: Yes        Correct Position: Yes        Correct Laterality: Yes        Site Marked: Yes  Procedure Documentation  Procedure: Adductor canal       Diagnosis: POST OPERATIVE PAIN       Laterality: right       Patient Position: supine       Patient Prep/Sterile Barriers: sterile gloves, mask       Skin prep: Chloraprep       Needle Type: short bevel and insulated       Needle Gauge: 21.        Needle Length (millimeters): 110        - Ultrasound guided       - Ultrasound used to identify targeted nerve, plexus, vascular marker, or fascial plane and place a needle adjacent to it in real-time       - Ultrasound was used to visualize the spread of anesthetic in close proximity to the above referenced structure       - A permanent image is entered into the patient's record.    Assessment/Narrative         The placement was negative for: blood aspirated, painful injection and site bleeding       Paresthesias: No.     Bolus given via needle..        Secured via.        Insertion/Infusion Method: Single Shot       Complications: none       Injection made incrementally with aspirations every 5 mL.

## 2021-03-18 NOTE — OP NOTE
[facility]    Operative Note    Pre-operative diagnosis: 65-year-old male with chronic right knee pain most likely caused by the medial compartmental end-stage osteoarthritic changes.  Post-operative diagnosis Same as pre-operative diagnosis    Procedure: Procedure(s):  ARTHROPLASTY, Right KNEE, TOTAL  Surgeon: Surgeon(s) and Role:     * Jewel Marcial MD - Primary     * Keron Henriquez MD - Fellow - Assisting  Anesthesia: Spinal   Estimated blood loss: Minimal  Drains: None  Specimens: * No specimens in log *  Findings:  Significant osteoarthritic changes of the medial and patellofemoral compartments.  Uncomplicated placement of right posterior stabilized total knee arthroplasty. .  Complications: None.  Implants:   Implant Name Type Inv. Item Serial No.  Lot No. LRB No. Used Action   BONE CEMENT SIMPLEX FULL DOSE 6191-1-001 Cement, Bone BONE CEMENT SIMPLEX FULL DOSE 6191-1-001  [employer] [id number] Right 1 Implanted   BONE CEMENT SIMPLEX FULL DOSE 6191-1-001 Cement, Bone BONE CEMENT SIMPLEX FULL DOSE 6191-1-001  [employer] [id number] Right 1 Implanted   IMP BASEPLATE TIBIAL HOWM TRI 5 5520-B-500 Total Joint Component/Insert IMP BASEPLATE TIBIAL HOWM TRI 5 5520-B-500  [employer] [id number] Right 1 Implanted   IMP COMP FEM STRK TRIATHLN PS RT 6 5515-F-602 Total Joint Component/Insert IMP COMP FEM STRK TRIATHLN PS RT 6 5515-F-602  [employer] [id number] Right 1 Implanted   IMP COMP PATELLA HOWM ASYM TRI [id number] 5551-L-320 Total Joint Component/Insert IMP COMP PATELLA HOWM ASYM TRI [id number] 5551-L-320  [employer] [id number] Right 1 Implanted   Triathalon X3 Tibial Bearing Insert- PS, size 5, 9mm thickness    [employer] [id number] Right 1 Implanted     Components used:  1. Crawford triathlon size 6 femoral Component  2. Crawford triathlon size 5 Tibial Component  3. [employer]en triathlon asymmetrical 32 mm Patellar Button    4. Crawford  triathlon posterior stabilized polyethylene Liner, Size  9 mm     Description of procedure:        Patient was seen in the preoperative area where procedure, risks and complications, expectations and rehabilitation were discussed once more.  All questions were answered.  Patient understands and agrees to the treatment plan as set forth.  Informed consent was obtained and the right lower extremity was marked.  Patient was then taken to the operating room where general anesthesia was induced.     Patient was positioned supine with a bump under the ipsilateral buttock. Bony prominences were well padded and the patient was secured to the table in the standard fashion.  An SCD was placed on the nonoperative leg.  Preoperative range of motion showed a flexion/extension of 130-0-0.      A tourniquet was placed on the operative thigh and the patient was prepped and draped in the normal sterile fashion.        After the completion of a timeout procedure a standard midline incision was made. Dissection was carried down to the knee retinaculum and the quadriceps tendon. A standard medial parapatellar arthrotomy was performed. Subperiosteal dissection was carried out along the medial proximal tibia. The fat pad was removed.  Care was taken to protect the patellar tendon and extensor mechanism during fat pad removal. The tissue along the anterior aspect of the distal femur was also removed.  The patella was subluxed and the knee was flexed.       The ACL and  PCL were released.  A drill was advanced down the femoral canal in a retrograde direction. The sword was set at 5 degrees of valgus in accordance with the preoperative plan and was advanced into the canal.  The distal femoral cutting block was then placed at +2 mm mm resection and pinned into place. The dinh was removed and the distal femur cuts were made medially and laterally.  The cutting block was removed and the dinh with the alignment piece was inserted into the canal  to ensure proper angle of the cut and a flat cut.       Attention was then directed towards the proximal tibia. The meniscus and soft tissue was removed to expose the medial and lateral tibial plateau. Retractors were placed around the knee to obtain good visualization, then the extramedullary tibial alignment guide was placed in the appropriate position. The 2-10 guide was used to select the resection level of the tibia and the cutting guide was pinned into position. With care directed towards preserving the posterior nerves and blood vessels and the medial collateral ligament, the saw was used to cut the proximal tibia.  Next, after resecting meniscal remnants both medially and laterally, the 10 mm sizing block was put in place to ensure proper alignment.  It showed adequate alignment using the drop dinh as well as good balancing of soft tissues.     We turned our attention to the back to the distal femur.  The posterior referencing femoral sizing block was used and the femur was measured to be a size  6.  Two holes were drilled to obtain 3 degrees of external rotation with respect to the posterior condylar axis of the femur.  The femoral cutting block was then slid over the pins and fixed onto the bone.  The anterior cut, posterior cut, anterior and posterior chamfer cuts were made.  The block was then removed and excess bone fragments were removed.   Now we visualized the posterior knee. A curved osteotome was used to remove posterior osteophytes. Our anesthetic injection was then introduced through the posterior capsule with extreme care directed towards not injuring the posterior neurovascular structures. The trial components (femur size 6) were placed and confirmed appropriate sizing of the flexion and extension gaps.  The drop dinh was used to confirm proper alignment of the cut.  A range of motion of 0 to 130 degrees with a well balanced knee in both flexion and extension was obtained. The box cutting guide  was used and the notch cut was performed. The excess bone was removed.  The lug holes were drilled.      The patella was then exposed.  Soft tissues were removed around the patella for visualization. The patella thickness was measured with a caliper to be 24, and a measured resection of 10 mm was made.  A caliper was then used to measure the residual thickness of the patella to be 14.  The patella was measured with the lollipops and found to be a size 32 mm asymmetric. The drill guide was placed and the three lug holes were drilled. The patella trial was then placed and the knee was ranged. The patella was found to track well.      The femoral trial component was removed the proximal tibia was again exposed and sized. The tibia was measured to be a size  5.   The tibial trial was placed into proper rotation and pinned into place.  The stove pipe was used to guide the drill, and then the wing punch was used. The tibial trial was then removed.      The patellar and femoral trials were removed. Pulsitile lavage was used to clean the bone in preparation for cementing. The bone was dried. Cement was mixed, and then all the components were cemented into place. While the cement was hardening, the remainder of the anesthetic injection was spread around the deep retinacular layer and the subcutaneous layer with a spinal needle.   The knee was irrigated with betadine lavage for approximately 3 minutes.  Once the cement had hardened, the excess cement was removed.    The tourniquet was released.  Hemostasis was obtained.       The  9 mm liner fit best and the knee had full extension to 130 degrees and was stable to anterior and posterior stress in flexion and extension as well as varus/valgus stress in 0, 30, and 90 degrees of flexion.  The trial liner was removed and the real liner was placed.       The knee was again copiously irrigated.   Closure was performed with a #1 symmetric Vicryl interrupted sutures in the  retinacular layer, 0 Vicryl and 2-0 Vicryl interrupted suture in the  subcutaneous tissues, and 3-0 monocryl in the skin.  A sterile dressing was applied.  The patient was then awakened, transferred to the [facility], and brought to the recovery room in stable condition. There were no complications.     POSTOPERATIVE PLAN:  Weight bearing: Weightbearing as tolerated  Anticoagulation: Aspirin 162 mg daily for 4 weeks  Precautions: No precautions  Pain control and monitoring  PT/OT  Antibiotics as scheduled  Discharge today or tomorrow when pain well controlled and ambulating safely.        Jewel Marcial MD      Adult Reconstruction  [facility] Department of Orthopaedic Surgery  Pager [phone]

## 2021-03-18 NOTE — PROGRESS NOTES
03/18/21 1534   Quick Adds   Type of Visit Initial PT Evaluation       Present no   Language English   Living Environment   People in home spouse   Current Living Arrangements house   Home Accessibility stairs to enter home;stairs within home   Number of Stairs, Main Entrance 2   Stair Railings, Main Entrance none   Number of Stairs, Within Home, Primary 7   Stair Railings, Within Home, Primary railing on right side (ascending);railings safe and in good condition   Transportation Anticipated family or friend will provide   Self-Care   Usual Activity Tolerance good   Current Activity Tolerance moderate   Regular Exercise Yes   Activity/Exercise Type walking   Exercise Amount/Frequency 3-5 times/wk   Equipment Currently Used at Home none  (has crutches and walker)   Disability/Function   Hearing Difficulty or Deaf no   Wear Glasses or Blind no   Concentrating, Remembering or Making Decisions Difficulty no   Difficulty Communicating no   Difficulty Eating/Swallowing no   Walking or Climbing Stairs Difficulty no   Dressing/Bathing Difficulty no   Toileting issues no   Doing Errands Independently Difficulty (such as shopping) no   Fall history within last six months no   Change in Functional Status Since Onset of Current Illness/Injury yes   General Information   Onset of Illness/Injury or Date of Surgery 03/18/21   Referring Physician Myla Daniel PA-C   Patient/Family Therapy Goals Statement (PT) Did not endorse   Pertinent History of Current Problem (include personal factors and/or comorbidities that impact the POC) s/p R TKA   Existing Precautions/Restrictions fall   Weight-Bearing Status - LUE full weight-bearing   Weight-Bearing Status - RUE full weight-bearing   Weight-Bearing Status - LLE full weight-bearing   Weight-Bearing Status - RLE weight-bearing as tolerated   General Observations Supine in bed upon arrival, agreeable to PT   Cognition   Orientation Status (Cognition)  oriented x 3   Affect/Mental Status (Cognition) WNL   Follows Commands (Cognition) WNL   Pain Assessment   Patient Currently in Pain Yes, see Vital Sign flowsheet   Integumentary/Edema   Integumentary/Edema Comments Patient with normal post-surgical swelling present   Posture    Posture Forward head position;Protracted shoulders;Kyphosis   Posture Comments Mild sitting EOB and standing   Range of Motion (ROM)   ROM Comment Did not formally assess, demonstrates functional ROM with mobility   Strength   Strength Comments Did not formally assess, demonstrates functional strength with mobility but trace effects of block still present   Bed Mobility   Comment (Bed Mobility) PT: Completes supine to sit transfer with supervision   Transfers   Transfer Safety Comments PT: Completse sit<>stand transfer with SBA and use of walker   Gait/Stairs (Locomotion)   Comment (Gait/Stairs) PT: Ambulates with SBA using walker, fairly steady but educated on decreased weight bearing d/tblock   Balance   Balance Comments Independent sitting balance, SBA for standing balance with UE support from walker   Sensory Examination   Sensory Perception Comments Patient still with trace effects of block present,. light touch sensation grossly intact   Clinical Impression   Criteria for Skilled Therapeutic Intervention yes, treatment indicated   PT Diagnosis (PT) impaired functional mobility   Influenced by the following impairments increased post-op pain, decreased R LE strength and ROM   Functional limitations due to impairments impaired bed mobility, transfers and ambulation   Clinical Presentation Stable/Uncomplicated   Clinical Presentation Rationale s/p R TKA, mobilizing well   Clinical Decision Making (Complexity) low complexity   Therapy Frequency (PT) One time eval and treatment only   Predicted Duration of Therapy Intervention (days/wks) 1 day   Planned Therapy Interventions (PT) bed mobility training;gait training;home exercise program;ROM  (range of motion);stair training;strengthening   Risk & Benefits of therapy have been explained evaluation/treatment results reviewed;care plan/treatment goals reviewed;risks/benefits reviewed;current/potential barriers reviewed   PT Discharge Planning    PT Discharge Recommendation (DC Rec) home with assist;home with outpatient physical therapy   PT Rationale for DC Rec PT: For progression per TKA protocol   PT Brief overview of current status  PT: SBA for bed mobility, transfers and ambulation with use of walker   Total Evaluation Time   Total Evaluation Time (Minutes) 8

## 2021-03-18 NOTE — ANESTHESIA PROCEDURE NOTES
Intrathecal injection Procedure Note  Pre-Procedure   Staff -        Anesthesiologist:  Cara Kowalski MD       Performed By: anesthesiologist       Location: OR       Pre-Anesthestic Checklist: patient identified, IV checked, risks and benefits discussed, informed consent, monitors and equipment checked, pre-op evaluation, at physician/surgeon's request and post-op pain management  Timeout:       Correct Patient: Yes        Correct Procedure: Yes        Correct Site: Yes        Correct Position: Yes   Procedure Documentation  Procedure: intrathecal injection       Patient Position: sitting       Patient Prep/Sterile Barriers: sterile gloves, mask       Skin prep: Chloraprep       Insertion Site: L3-4. (midline approach).       Needle Gauge: 25.        Needle Length (Inches): 3.5        Introducer used      # of attempts: 1 and  # of redirects:     Assessment/Narrative         Paresthesias: No.       CSF fluid: clear.    Medication(s) Administered   0.75% Hyperbaric Bupivacaine (Intrathecal), 1.6 mL  Medication Administration Time: 3/18/2021 10:27 AM

## 2021-03-18 NOTE — ANESTHESIA CARE TRANSFER NOTE
Patient: Shankar Armenta    Procedure(s):  ARTHROPLASTY, Right KNEE, TOTAL    Diagnosis: Primary osteoarthritis of right knee [M17.11]  Diagnosis Additional Information: No value filed.    Anesthesia Type:   Spinal     Note:    Oropharynx: oropharynx clear of all foreign objects  Level of Consciousness: awake  Oxygen Supplementation: face mask  Level of Supplemental Oxygen (L/min / FiO2): 7  Independent Airway: airway patency satisfactory and stable  Dentition: dentition unchanged  Vital Signs Stable: post-procedure vital signs reviewed and stable  Report to RN Given: handoff report given  Patient transferred to: PACU    Handoff Report: Identifed the Patient, Identified the Reponsible Provider, Reviewed the pertinent medical history, Discussed the surgical course, Reviewed Intra-OP anesthesia mangement and issues during anesthesia, Set expectations for post-procedure period and Allowed opportunity for questions and acknowledgement of understanding      Vitals: (Last set prior to Anesthesia Care Transfer)  CRNA VITALS  3/18/2021 1303 - 3/18/2021 1339      3/18/2021             Temp:  36.9  C (98.4  F)    EKG:  Sinus rhythm        Electronically Signed By: PRISCA Gambino CRNA  March 18, 2021  1:39 PM

## 2021-03-18 NOTE — PLAN OF CARE
[facility]      OUTPATIENT PHYSICAL THERAPY EVALUATION  PLAN OF TREATMENT FOR OUTPATIENT REHABILITATION  (COMPLETE FOR INITIAL CLAIMS ONLY)  Patient's Last Name, First Name, M.I.  YOB: 1955  Shankar Armenta                        Provider's Name  [facility] Medical Record No.  [id number]                               Onset Date:  03/18/21   Start of Care Date:         Type:     _X_PT   ___OT   ___SLP Medical Diagnosis:                           PT Diagnosis:  impaired functional mobility   Visits from SOC:  1   _________________________________________________________________________________  Plan of Treatment/Functional Goals    Planned Interventions: bed mobility training, gait training, home exercise program, ROM (range of motion), stair training, strengthening     Goals: See Physical Therapy Goals on Care Plan in [facility] electronic health record.    Therapy Frequency: One time eval and treatment only  Predicted Duration of Therapy Intervention: 1 day  _________________________________________________________________________________    I CERTIFY THE NEED FOR THESE SERVICES FURNISHED UNDER        THIS PLAN OF TREATMENT AND WHILE UNDER MY CARE     (Physician co-signature of this document indicates review and certification of the therapy plan).               ,      Referring Physician: Myla Daniel PA-C            Initial Assessment        See Physical Therapy evaluation dated   in Epic electronic health record.

## 2021-03-18 NOTE — ANESTHESIA POSTPROCEDURE EVALUATION
Patient: Shankar Armenta    Procedure(s):  ARTHROPLASTY, Right KNEE, TOTAL    Diagnosis:Primary osteoarthritis of right knee [M17.11]  Diagnosis Additional Information: No value filed.    Anesthesia Type:  Spinal    Note:  Disposition: Inpatient   Postop Pain Control: Uneventful            Sign Out: Well controlled pain   PONV: No   Neuro/Psych: Uneventful            Sign Out: Acceptable/Baseline neuro status   Airway/Respiratory: Uneventful            Sign Out: Acceptable/Baseline resp. status   CV/Hemodynamics: Uneventful            Sign Out: Acceptable CV status   Other NRE: NONE   DID A NON-ROUTINE EVENT OCCUR? No         Last vitals:  Vitals:    03/18/21 1000 03/18/21 1335 03/18/21 1345   BP: 133/84 104/55 100/60   Pulse: 58 80 76   Resp: 21 22 17   Temp:  36.9  C (98.4  F)    SpO2: 97% 98% 92%       Last vitals prior to Anesthesia Care Transfer:  CRNA VITALS  3/18/2021 1303 - 3/18/2021 1403      3/18/2021             Temp:  36.9  C (98.4  F)    EKG:  Sinus rhythm          Electronically Signed By: Cara Kowalski MD  March 18, 2021  2:16 PM

## 2021-03-18 NOTE — PLAN OF CARE
Physical Therapy Discharge Summary    Reason for therapy discharge:    All goals and outcomes met, no further needs identified.    Progress towards therapy goal(s). See goals on Care Plan in [facility] electronic health record for goal details.  Goals met    Therapy recommendation(s):    Continued therapy is recommended.  Rationale/Recommendations:  OP PT per protocol.

## 2021-03-18 NOTE — OR NURSING
Pt working with PT at bedside.  Pt up ambulating with walker.  Patient was able to walk up and down a flight of stairs with PT, per Dr. Marcial okay to discharge home.  Patient feels comfortable with going home at this time.  Pt brought to phase II and report given to Minerva MARTINEZ

## 2021-03-19 ENCOUNTER — TELEPHONE (OUTPATIENT)
Dept: ORTHOPEDICS | Facility: CLINIC | Age: 66
End: 2021-03-19

## 2021-03-19 DIAGNOSIS — M17.11 PRIMARY OSTEOARTHRITIS OF RIGHT KNEE: Primary | ICD-10-CM

## 2021-03-19 NOTE — TELEPHONE ENCOUNTER
RN called patient back. RN previously received phone call from Nette from Pre admission. Turns out patient was a former patient of Dr. Hua and he sent patient home with a CPM machine.  Dr. Marcial typically don't do that but if patient asks for it, he will send them home with one.  RN placed the order in the system.  Called Trinh  from [facility].  Gave patient's demographics and Trinh will call patient and to deliver this CPM machine.    Patient expressed understanding.    Bertha Tripathi RN

## 2021-03-19 NOTE — TELEPHONE ENCOUNTER
RN called patient and explained to patient to call the hospital in regards to the CPM machine. Patient expressed understanding.    Bertha Tripathi RN        [facility] Call Center    Phone Message    May a detailed message be left on voicemail: yes     Reason for Call: Other: Patient was under the impression that he'll get the CPM machine today. Do we know when the CPM will get delivered to his home?     Action Taken: Message routed to:  [facility] Orthopedics    Travel Screening: Not Applicable

## 2021-03-19 NOTE — TELEPHONE ENCOUNTER
RN called patient back and told patient to try another number to get ahold of the a nurse, charge nurse from the hospital in regards to this CPM machine.    Bertha Tripathi RN

## 2021-03-19 NOTE — TELEPHONE ENCOUNTER
[facility] Call Center    Phone Message    May a detailed message be left on voicemail: yes     Reason for Call: Other: Patient want another call back from [facility]. When he called the 3000# they transferred him to physical therapy [phone]. He is confused and just need to know where or how the CPM machine will get to him.     Action Taken: Message routed to:  [facility] Orthopedics    Travel Screening: Not Applicable

## 2021-03-25 ENCOUNTER — THERAPY VISIT (OUTPATIENT)
Dept: PHYSICAL THERAPY | Facility: CLINIC | Age: 66
End: 2021-03-25
Attending: STUDENT IN AN ORGANIZED HEALTH CARE EDUCATION/TRAINING PROGRAM
Payer: COMMERCIAL

## 2021-03-25 DIAGNOSIS — Z96.652 STATUS POST TOTAL LEFT KNEE REPLACEMENT: ICD-10-CM

## 2021-03-25 DIAGNOSIS — M17.11 PRIMARY OSTEOARTHRITIS OF RIGHT KNEE: ICD-10-CM

## 2021-03-25 DIAGNOSIS — M17.12 PRIMARY OSTEOARTHRITIS OF LEFT KNEE: ICD-10-CM

## 2021-03-25 PROCEDURE — 97161 PT EVAL LOW COMPLEX 20 MIN: CPT | Mod: GP | Performed by: PHYSICAL THERAPIST

## 2021-03-25 PROCEDURE — 97110 THERAPEUTIC EXERCISES: CPT | Mod: GP | Performed by: PHYSICAL THERAPIST

## 2021-03-25 ASSESSMENT — ACTIVITIES OF DAILY LIVING (ADL)
STIFFNESS: THE SYMPTOM AFFECTS MY ACTIVITY SEVERELY
GO DOWN STAIRS: ACTIVITY IS FAIRLY DIFFICULT
SWELLING: THE SYMPTOM AFFECTS MY ACTIVITY MODERATELY
SIT WITH YOUR KNEE BENT: I AM UNABLE TO DO THE ACTIVITY
KNEE_ACTIVITY_OF_DAILY_LIVING_SUM: 27
KNEEL ON THE FRONT OF YOUR KNEE: I AM UNABLE TO DO THE ACTIVITY
GIVING WAY, BUCKLING OR SHIFTING OF KNEE: I DO NOT HAVE THE SYMPTOM
GO UP STAIRS: ACTIVITY IS FAIRLY DIFFICULT
AS_A_RESULT_OF_YOUR_KNEE_INJURY,_HOW_WOULD_YOU_RATE_YOUR_CURRENT_LEVEL_OF_DAILY_ACTIVITY?: SEVERELY ABNORMAL
PAIN: THE SYMPTOM AFFECTS MY ACTIVITY SEVERELY
WALK: ACTIVITY IS FAIRLY DIFFICULT
STAND: ACTIVITY IS SOMEWHAT DIFFICULT
HOW_WOULD_YOU_RATE_THE_OVERALL_FUNCTION_OF_YOUR_KNEE_DURING_YOUR_USUAL_DAILY_ACTIVITIES?: SEVERELY ABNORMAL
WEAKNESS: I HAVE THE SYMPTOM BUT IT DOES NOT AFFECT MY ACTIVITY
KNEE_ACTIVITY_OF_DAILY_LIVING_SCORE: 38.57
RAW_SCORE: 27
HOW_WOULD_YOU_RATE_THE_CURRENT_FUNCTION_OF_YOUR_KNEE_DURING_YOUR_USUAL_DAILY_ACTIVITIES_ON_A_SCALE_FROM_0_TO_100_WITH_100_BEING_YOUR_LEVEL_OF_KNEE_FUNCTION_PRIOR_TO_YOUR_INJURY_AND_0_BEING_THE_INABILITY_TO_PERFORM_ANY_OF_YOUR_USUAL_DAILY_ACTIVITIES?: 12
SQUAT: I AM UNABLE TO DO THE ACTIVITY
RISE FROM A CHAIR: ACTIVITY IS FAIRLY DIFFICULT
LIMPING: THE SYMPTOM AFFECTS MY ACTIVITY SLIGHTLY

## 2021-03-25 NOTE — PROGRESS NOTES
Physical Therapy Initial Evaluation  Subjective:    Patient Health History  Shankar Armenta being seen for Right knee.     Problem began: 3/18/2021.   Problem occurred: s/p R TKA   Pain is reported as 5/10 on pain scale.  General health as reported by patient is good.  Pertinent medical history includes: diabetes and high blood pressure.   Red flags:  None as reported by patient.  Medical allergies: none.   Surgeries include:  Orthopedic surgery and heart surgery (s/p stent placement, s/p L TKA 2016).    Current medications:  High blood pressure medication.    Current occupation is retired.   Primary job tasks include:  Computer work, driving and lifting/carrying.                  Therapist Generated HPI Evaluation         Type of problem:  Right knee.    This is a new condition.  Condition occurred with:  Degenerative joint disease.    Patient reports pain:  Lower leg, anterior and in the joint.  Pain is described as aching and is constant.  Pain is the same all the time.  Since onset symptoms are gradually worsening (after nerve block).  Associated symptoms:  Edema, loss of motion/stiffness and loss of strength. Symptoms are exacerbated by ascending stairs, bending/squatting, descending stairs, kneeling, walking, standing, transfers and weight bearing  and relieved by NSAID's and ice.  Imaging testing: none since surgery.    Work activity restrictions: N/A.  Barriers include:  Transportation.                        Objective:  Standing Alignment:              Knee deviations alignment: stands lacking full R knee extension, reduced WB on R LE.      Gait:    Gait Type:  Antalgic   Weight Bearing Status:  WBAT   Assistive Devices:  Walker  Deviations:  Knee:  Knee extension decr R and knee flexion decr RGeneral Deviations:  Silvia decr, stance time decr and stride length decr    Flexibility/Screens:       Lower Extremity:      Decreased right lower extremity flexibility:  Quadriceps; Hamstrings and Gastroc                                                       Knee Evaluation:  ROM:    AROM    Hyperextension: Left:     Right: 0  Extension: Left:    Right:  10  Flexion: Left:   Right: 74    Pain: limiting all ROM of R knee  Endfeel: empty/painful  Strength:     Extension:  Left: 5/5    Pain:-      Right: 3-/5    Pain:+  Flexion:  Left:/5   Pain:-      Right: 3-/5    Pain:++    Quad Set Left:  WNL    Pain: -   Quad Set Right:  Fair    Pain: +/-      Palpation:      Right knee tenderness present at:  Medial Joint Line; Lateral Joint Line; Incisional; Patellar Medial; Patellar Lateral; Patellar Superior and Patellar Inferior  Edema:  Edema of the knee: pitting edema present R lower leg.    Mobility Testing:      Patellofemoral Medial:  Right: hypomobile  Patellofemoral Lateral:  Right: hypomobile  Patellofemoral Superior:  Right: hypomobile  Patellofemoral Inferior:  Right: hypomobile  Functional Testing:            Proprioception:   Stork Balance Test:  Left:  <10 seconds  Right:  Unable  % of Uninvolved:           General     ROS    Assessment/Plan:    Patient is a 65 year old male with right knee complaints.    Patient has the following significant findings with corresponding treatment plan.                Diagnosis 1:  S/p R TKA  Pain -  hot/cold therapy, manual therapy, self management, education and home program  Decreased ROM/flexibility - manual therapy, therapeutic exercise, therapeutic activity and home program  Decreased joint mobility - manual therapy, therapeutic exercise, therapeutic activity and home program  Decreased strength - therapeutic exercise, therapeutic activities and home program  Decreased proprioception - neuro re-education, gait training, therapeutic activities and home program  Impaired gait - gait training, assistive devices and home program  Decreased function - therapeutic activities and home program    Therapy Evaluation Codes:   1) History comprised of:   Personal factors that impact the plan of care:       Living environment.    Comorbidity factors that impact the plan of care are:      None.     Medications impacting care: None.  2) Examination of Body Systems comprised of:   Body structures and functions that impact the plan of care:      Knee.   Activity limitations that impact the plan of care are:      Bathing, Driving, Dressing, Sitting, Squatting/kneeling, Stairs, Standing and Walking.  3) Clinical presentation characteristics are:   Stable/Uncomplicated.  4) Decision-Making    Low complexity using standardized patient assessment instrument and/or measureable assessment of functional outcome.  Cumulative Therapy Evaluation is: Low complexity.    Previous and current functional limitations:  (See Goal Flow Sheet for this information)    Short term and Long term goals: (See Goal Flow Sheet for this information)     Communication ability:  Patient appears to be able to clearly communicate and understand verbal and written communication and follow directions correctly.  Treatment Explanation - The following has been discussed with the patient:   RX ordered/plan of care  Anticipated outcomes  Possible risks and side effects  This patient would benefit from PT intervention to resume normal activities.   Rehab potential is good.    Frequency:  2 X week, once daily  Duration:  for up to 8 weeks(up to 16)  Discharge Plan:  Achieve all LTG.  Independent in home treatment program.  Reach maximal therapeutic benefit.    Please refer to the daily flowsheet for treatment today, total treatment time and time spent performing 1:1 timed codes.

## 2021-04-01 ENCOUNTER — TELEPHONE (OUTPATIENT)
Dept: ORTHOPEDICS | Facility: CLINIC | Age: 66
End: 2021-04-01

## 2021-04-01 DIAGNOSIS — M17.11 PRIMARY OSTEOARTHRITIS OF RIGHT KNEE: ICD-10-CM

## 2021-04-01 RX ORDER — GABAPENTIN 100 MG/1
CAPSULE ORAL
Qty: 120 CAPSULE | Refills: 0 | Status: SHIPPED | OUTPATIENT
Start: 2021-04-01

## 2021-04-01 NOTE — TELEPHONE ENCOUNTER
[facility] Call Center    Phone Message    May a detailed message be left on voicemail: no     Reason for Call: Other: Would like a c/b to discuss having brief periods of intense pain     Action Taken: Message routed to:  [facility]    Travel Screening: Not Applicable

## 2021-04-01 NOTE — TELEPHONE ENCOUNTER
Returned call to patient. Patient states he has been having short burst of intense pain that last about 10-15 minutes. He states that there is nothing in particular that instigates these. He is taking Tylenol during the day and Gabapentin at night. He does not take the oxycodone as he does not tolerate this. Discussed that this most likely is nerve pain which is normal during the healing process and the gabapentin can help this. Patient requests a refill of his gabapentin and requests taking this during the day as well. Okayed this with Dr. Marcial  Will send to patient's pharmacy. Patient notified. Patient will reach out if he has any issues or this does not help.

## 2021-04-07 DIAGNOSIS — Z96.651 HX OF TOTAL KNEE ARTHROPLASTY, RIGHT: Primary | ICD-10-CM

## 2021-04-13 ENCOUNTER — THERAPY VISIT (OUTPATIENT)
Dept: PHYSICAL THERAPY | Facility: CLINIC | Age: 66
End: 2021-04-13
Payer: COMMERCIAL

## 2021-04-13 DIAGNOSIS — M17.11 PRIMARY OSTEOARTHRITIS OF RIGHT KNEE: Primary | ICD-10-CM

## 2021-04-13 DIAGNOSIS — Z96.652 STATUS POST TOTAL LEFT KNEE REPLACEMENT: ICD-10-CM

## 2021-04-13 PROCEDURE — 97140 MANUAL THERAPY 1/> REGIONS: CPT | Mod: GP | Performed by: PHYSICAL THERAPIST

## 2021-04-13 PROCEDURE — 97110 THERAPEUTIC EXERCISES: CPT | Mod: GP | Performed by: PHYSICAL THERAPIST

## 2021-04-13 PROCEDURE — 97112 NEUROMUSCULAR REEDUCATION: CPT | Mod: GP | Performed by: PHYSICAL THERAPIST

## 2021-04-14 ENCOUNTER — OFFICE VISIT (OUTPATIENT)
Dept: ORTHOPEDICS | Facility: CLINIC | Age: 66
End: 2021-04-14
Payer: COMMERCIAL

## 2021-04-14 ENCOUNTER — ANCILLARY PROCEDURE (OUTPATIENT)
Dept: GENERAL RADIOLOGY | Facility: CLINIC | Age: 66
End: 2021-04-14
Attending: STUDENT IN AN ORGANIZED HEALTH CARE EDUCATION/TRAINING PROGRAM
Payer: COMMERCIAL

## 2021-04-14 DIAGNOSIS — Z96.651 HX OF TOTAL KNEE ARTHROPLASTY, RIGHT: ICD-10-CM

## 2021-04-14 DIAGNOSIS — M17.11 PRIMARY OSTEOARTHRITIS OF RIGHT KNEE: Primary | ICD-10-CM

## 2021-04-14 PROBLEM — E11.9 DIABETES MELLITUS, TYPE 2 (H): Status: ACTIVE | Noted: 2021-04-14

## 2021-04-14 PROCEDURE — 99024 POSTOP FOLLOW-UP VISIT: CPT | Performed by: STUDENT IN AN ORGANIZED HEALTH CARE EDUCATION/TRAINING PROGRAM

## 2021-04-14 PROCEDURE — 73560 X-RAY EXAM OF KNEE 1 OR 2: CPT | Mod: XU | Performed by: RADIOLOGY

## 2021-04-14 PROCEDURE — 77073 BONE LENGTH STUDIES: CPT | Performed by: RADIOLOGY

## 2021-04-14 RX ORDER — METHOCARBAMOL 500 MG/1
500 TABLET, FILM COATED ORAL 4 TIMES DAILY PRN
Qty: 30 TABLET | Refills: 0 | Status: SHIPPED | OUTPATIENT
Start: 2021-04-14

## 2021-04-14 NOTE — LETTER
4/14/2021         RE: Shankar Armenta  [address] [address]        Dear Colleague,    Thank you for referring your patient, Shankar Armenta, to the [facility] [place]. Please see a copy of my visit note below.        [facility]  Orthopaedic Surgery Consultation by Jewel Marcial M.D.    Shankar Armetna MRN# [id number]   Age: 65 year old YOB: 1955     Requesting physician: No ref. provider found  Joshua Herman     Background history:  DX:  1. Bilateral knee osteoarthritis  2. Diabetes mellitus  3. Hypertension  4. Coronary artery disease and PTCA on aspirin.    TREATMENTS:  10/17/2016, left total knee arthroplasty, , [place] triathlon femur size 5 tibia size 5, 32 mm asymmetric patella, 13 mm PS polyethylene liner.  03/18/2021, Right total knee arthroplasty, Dr. Marcial           History of Present Illness:     65-year-old male with chronic right knee pain most likely caused by the medial compartmental end-stage osteoarthritic changes who underwent right total knee arthroplasty on 3/18/2021.    Today patient is approximately 4 weeks out.  He states he is doing relatively well.  His pain is well controlled he only uses Tylenol.  He does endorse some cramping in his calf at night.  He has not used any muscle relaxants yet.  His preoperative pain is gone.  He works with physical therapy on his range of motion.  During his PT yesterday was able to flex up to 100 degrees.  On his CPM he flexes up to 110 degrees.    Social:   Occupation:   Living situation: Was together with spouse.  Has to walk stairs.  Hobbies / Sports:     Smoking: No  Alcohol: No  Illicit drug use: No         Physical Exam:     EXAMINATION pertinent findings:   PSYCH: Pleasant, healthy-appearing, alert, oriented x3, cooperative. Normal mood and affect.  VITAL SIGNS: There were no vitals taken for this visit..  Reviewed nursing intake notes.   There is no height or weight on  file to calculate BMI.  RESP: non labored breathing   ABD: benign, soft, non-tender, no acute peritoneal findings  SKIN: grossly normal   LYMPHATIC: grossly normal, no adenopathy, no extremity edema  NEURO: grossly normal , no motor deficits  VASCULAR: satisfactory perfusion of all extremities   MUSCULOSKELETAL:   Alignment: Slight varus alignment  Gait: Slightly antalgic over right lower extremity.  Ambulates with cane for stability.    R knee: Incision is clean, dry and intact.  ROM 95-0-0  .  Straight leg raise +. No redness, warmth or skin changes present. Effusion minimal. Ligamentously stable in both ML and AP direction.  No signs of DVT.    Right LE:   Thigh and leg compartments soft and compressible   +Quad/TA/GSC/FHL/EHL   SILT DP/SP/Trell/Saph/Tib nerve distributions   Palpable dorsalis pedis pulse          Data:   All laboratory data reviewed  All imaging studies reviewed by me personally.    XR knee right 4/14/2021:  Status post right total knee arthroplasty.  Adequate sizing, fixation and positioning of components.  Slight varus alignment.  No signs of postoperative complications.         Assessment and Plan:   Assessment:  65-year-old male with chronic right knee pain most likely caused by the medial compartmental end-stage osteoarthritic changes who underwent right total knee arthroplasty on 3/18/2021.  Recovering appropriately.     Plan:  I discussed my findings with the patient.  He will continue his physical therapy for range of motion and strengthening exercises.  I will prescribe him Robaxin in hopes of mitigating his cramps at night.  He can stop his DVT prophylaxis as of today.  We will follow-up with him in 2 months or sooner if there are any questions or concerns.    Jewel Marcial MD, PhD     Adult Reconstruction  [facility] Department of Orthopaedic Surgery  Pager [phone]

## 2021-04-14 NOTE — PROGRESS NOTES
[facility]  Orthopaedic Surgery Consultation by Jewel Marcial M.D.    Shankar Armenta MRN# [id number]   Age: 65 year old YOB: 1955     Requesting physician: No ref. provider found  Joshua Herman     Background history:  DX:  1. Bilateral knee osteoarthritis  2. Diabetes mellitus  3. Hypertension  4. Coronary artery disease and PTCA on aspirin.    TREATMENTS:  10/17/2016, left total knee arthroplasty, , [place] triathlon femur size 5 tibia size 5, 32 mm asymmetric patella, 13 mm PS polyethylene liner.  03/18/2021, Right total knee arthroplasty, Dr. Marcial           History of Present Illness:     65-year-old male with chronic right knee pain most likely caused by the medial compartmental end-stage osteoarthritic changes who underwent right total knee arthroplasty on 3/18/2021.    Today patient is approximately 4 weeks out.  He states he is doing relatively well.  His pain is well controlled he only uses Tylenol.  He does endorse some cramping in his calf at night.  He has not used any muscle relaxants yet.  His preoperative pain is gone.  He works with physical therapy on his range of motion.  During his PT yesterday was able to flex up to 100 degrees.  On his CPM he flexes up to 110 degrees.    Social:   Occupation:   Living situation: Was together with spouse.  Has to walk stairs.  Hobbies / Sports:     Smoking: No  Alcohol: No  Illicit drug use: No         Physical Exam:     EXAMINATION pertinent findings:   PSYCH: Pleasant, healthy-appearing, alert, oriented x3, cooperative. Normal mood and affect.  VITAL SIGNS: There were no vitals taken for this visit..  Reviewed nursing intake notes.   There is no height or weight on file to calculate BMI.  RESP: non labored breathing   ABD: benign, soft, non-tender, no acute peritoneal findings  SKIN: grossly normal   LYMPHATIC: grossly normal, no adenopathy, no extremity edema  NEURO: grossly normal , no motor deficits  VASCULAR:  satisfactory perfusion of all extremities   MUSCULOSKELETAL:   Alignment: Slight varus alignment  Gait: Slightly antalgic over right lower extremity.  Ambulates with cane for stability.    R knee: Incision is clean, dry and intact.  ROM 95-0-0  .  Straight leg raise +. No redness, warmth or skin changes present. Effusion minimal. Ligamentously stable in both ML and AP direction.  No signs of DVT.    Right LE:   Thigh and leg compartments soft and compressible   +Quad/TA/GSC/FHL/EHL   SILT DP/SP/Trell/Saph/Tib nerve distributions   Palpable dorsalis pedis pulse          Data:   All laboratory data reviewed  All imaging studies reviewed by me personally.    XR knee right 4/14/2021:  Status post right total knee arthroplasty.  Adequate sizing, fixation and positioning of components.  Slight varus alignment.  No signs of postoperative complications.         Assessment and Plan:   Assessment:  65-year-old male with chronic right knee pain most likely caused by the medial compartmental end-stage osteoarthritic changes who underwent right total knee arthroplasty on 3/18/2021.  Recovering appropriately.     Plan:  I discussed my findings with the patient.  He will continue his physical therapy for range of motion and strengthening exercises.  I will prescribe him Robaxin in hopes of mitigating his cramps at night.  He can stop his DVT prophylaxis as of today.  We will follow-up with him in 2 months or sooner if there are any questions or concerns.    Jewel Marcial MD, PhD     Adult Reconstruction  [facility] Department of Orthopaedic Surgery  Pager [phone]

## 2021-04-20 ENCOUNTER — THERAPY VISIT (OUTPATIENT)
Dept: PHYSICAL THERAPY | Facility: CLINIC | Age: 66
End: 2021-04-20
Payer: COMMERCIAL

## 2021-04-20 DIAGNOSIS — M17.11 PRIMARY OSTEOARTHRITIS OF RIGHT KNEE: Primary | ICD-10-CM

## 2021-04-20 DIAGNOSIS — Z96.652 STATUS POST TOTAL LEFT KNEE REPLACEMENT: ICD-10-CM

## 2021-04-20 PROCEDURE — 97110 THERAPEUTIC EXERCISES: CPT | Mod: GP | Performed by: PHYSICAL THERAPIST

## 2021-05-10 ENCOUNTER — TELEPHONE (OUTPATIENT)
Dept: ORTHOPEDICS | Facility: CLINIC | Age: 66
End: 2021-05-10

## 2021-05-10 NOTE — TELEPHONE ENCOUNTER
1:30 PM  Pt phoned RN back and we reviewed his symptoms.  It sounds like pt has stitch abscess and we reviewed keeping it clean and dry, no squeezing the area, monitor for changes.  We also reviewed that with increased activity, which pt had done, he may still experience some swelling and warmth of the operative TKA from seven weeks ago.  We reviewed RICE and giving it time, calling back if his symptoms do not improve.  Pt verbalized understanding and agreement.  Shyann Freeman RN      1:00PM  Shankar was called back by RN and voicemail was left for him to call back directly.  Message also included instructions about elevation, icing, rest, and sending photo of the stitch area if possible.  We will await Shankar's call back to determine next step.  Shyann Freeman RN  [phone]

## 2021-05-10 NOTE — TELEPHONE ENCOUNTER
[facility] Call Center    Phone Message    May a detailed message be left on voicemail: yes     Reason for Call: Other: This pt of Dr. Marcial's called to speak with someone on Dr. Marcial's care team. This pt had surgery with Dr. Marcial on  3/18/2021. The pt stated that his knee will swell, become stiff and warm to the touch. The pt stated that some of the stiches are protruding and puss will form around those sites. Please have someone on Dr. Marcial's care team follow-up with this pt to discuss his symptoms and determine what the next steps should be.     Action Taken: Message routed to:  [facility] Ortho    Travel Screening: Not Applicable

## 2021-05-13 NOTE — PROGRESS NOTES
[facility]  Orthopaedic Surgery Consultation by Jewel Marcial M.D.    Shankar Armenta MRN# [id number]   Age: 65 year old YOB: 1955     Requesting physician: No ref. provider found  Joshua Herman     Background history:  DX:  1. Bilateral knee osteoarthritis  2. Diabetes mellitus  3. Hypertension  4. Coronary artery disease and PTCA on aspirin.    TREATMENTS:  1. 10/17/2016, left total knee arthroplasty, , Aakash triathlon femur size 5 tibia size 5, 32 mm asymmetric patella, 13 mm PS polyethylene liner.  2. 03/18/2021, Right total knee arthroplasty, Dr. Marcial           History of Present Illness:     65-year-old male with chronic right knee pain most likely caused by the medial compartmental end-stage osteoarthritic changes who underwent right total knee arthroplasty on 3/18/2021.    Today patient is approximately 3 months out.  He states he is doing well.  His pain has greatly diminished and he is no longer using Tylenol.  His preoperative pain is gone and has been working with physical therapy on his range of motion.  He is happy with the result.    Social:   Occupation:   Living situation: Was together with spouse.  Has to walk stairs.  Hobbies / Sports:     Smoking: No  Alcohol: No  Illicit drug use: No         Physical Exam:     EXAMINATION pertinent findings:   PSYCH: Pleasant, healthy-appearing, alert, oriented x3, cooperative. Normal mood and affect.  VITAL SIGNS: There were no vitals taken for this visit..  Reviewed nursing intake notes.   There is no height or weight on file to calculate BMI.  RESP: non labored breathing   ABD: benign, soft, non-tender, no acute peritoneal findings  SKIN: grossly normal   LYMPHATIC: grossly normal, no adenopathy, no extremity edema  NEURO: grossly normal , no motor deficits  VASCULAR: satisfactory perfusion of all extremities   MUSCULOSKELETAL:   Alignment: Slight varus alignment  Gait: Slightly antalgic over right lower extremity.   Ambulates with cane for stability.    R knee: Incision is clean, dry and intact.  -0-0  .  Straight leg raise +. No redness, warmth or skin changes present. Effusion minimal. Ligamentously stable in both ML and AP direction.  No signs of DVT.    Right LE:   Thigh and leg compartments soft and compressible   +Quad/TA/GSC/FHL/EHL   SILT DP/SP/Trell/Saph/Tib nerve distributions   Palpable dorsalis pedis pulse          Data:   All laboratory data reviewed  All imaging studies reviewed by me personally.    XR knee right 4/14/2021:  Status post right total knee arthroplasty.  Adequate sizing, fixation and positioning of components.  Slight varus alignment.  No signs of postoperative complications.    No new imaging studies were obtained today.         Assessment and Plan:   Assessment:  65-year-old male with chronic right knee pain most likely caused by the medial compartmental end-stage osteoarthritic changes who underwent right total knee arthroplasty on 3/18/2021.  Recovering well.     Plan:  I discussed my findings with the patient.  He will continue his range of motion and strengthening exercises.  Patient understands and agrees to the treatment plan as set forth.  We will follow-up with him at his 1 year postoperative date with renewed radiographic imaging studies or sooner if there are any questions or concerns.    Jewel Marcial MD, PhD     Adult Reconstruction  [facility] Department of Orthopaedic Surgery  Pager [phone]    Total combined visit time and work time before and after clinic visit = 20 min

## 2021-06-09 ENCOUNTER — OFFICE VISIT (OUTPATIENT)
Dept: ORTHOPEDICS | Facility: CLINIC | Age: 66
End: 2021-06-09
Payer: COMMERCIAL

## 2021-06-09 DIAGNOSIS — Z96.651 STATUS POST RIGHT KNEE REPLACEMENT: Primary | ICD-10-CM

## 2021-06-09 PROCEDURE — 99024 POSTOP FOLLOW-UP VISIT: CPT | Performed by: STUDENT IN AN ORGANIZED HEALTH CARE EDUCATION/TRAINING PROGRAM

## 2021-06-09 NOTE — LETTER
6/9/2021         RE: Shankar Armenta  [address] [address]        Dear Colleague,    Thank you for referring your patient, Shankar Armenta, to the [facility] [place]. Please see a copy of my visit note below.        [facility]  Orthopaedic Surgery Consultation by Jewel Marcial M.D.    Shankar Armenta MRN# [id number]   Age: 65 year old YOB: 1955     Requesting physician: No ref. provider found  Joshua Herman     Background history:  DX:  1. Bilateral knee osteoarthritis  2. Diabetes mellitus  3. Hypertension  4. Coronary artery disease and PTCA on aspirin.    TREATMENTS:  1. 10/17/2016, left total knee arthroplasty, , Aakash triathlon femur size 5 tibia size 5, 32 mm asymmetric patella, 13 mm PS polyethylene liner.  2. 03/18/2021, Right total knee arthroplasty, Dr. Marcial           History of Present Illness:     65-year-old male with chronic right knee pain most likely caused by the medial compartmental end-stage osteoarthritic changes who underwent right total knee arthroplasty on 3/18/2021.    Today patient is approximately 3 months out.  He states he is doing well.  His pain has greatly diminished and he is no longer using Tylenol.  His preoperative pain is gone and has been working with physical therapy on his range of motion.  He is happy with the result.    Social:   Occupation:   Living situation: Was together with spouse.  Has to walk stairs.  Hobbies / Sports:     Smoking: No  Alcohol: No  Illicit drug use: No         Physical Exam:     EXAMINATION pertinent findings:   PSYCH: Pleasant, healthy-appearing, alert, oriented x3, cooperative. Normal mood and affect.  VITAL SIGNS: There were no vitals taken for this visit..  Reviewed nursing intake notes.   There is no height or weight on file to calculate BMI.  RESP: non labored breathing   ABD: benign, soft, non-tender, no acute peritoneal findings  SKIN: grossly normal   LYMPHATIC:  grossly normal, no adenopathy, no extremity edema  NEURO: grossly normal , no motor deficits  VASCULAR: satisfactory perfusion of all extremities   MUSCULOSKELETAL:   Alignment: Slight varus alignment  Gait: Slightly antalgic over right lower extremity.  Ambulates with cane for stability.    R knee: Incision is clean, dry and intact.  -0-0  .  Straight leg raise +. No redness, warmth or skin changes present. Effusion minimal. Ligamentously stable in both ML and AP direction.  No signs of DVT.    Right LE:   Thigh and leg compartments soft and compressible   +Quad/TA/GSC/FHL/EHL   SILT DP/SP/Trell/Saph/Tib nerve distributions   Palpable dorsalis pedis pulse          Data:   All laboratory data reviewed  All imaging studies reviewed by me personally.    XR knee right 4/14/2021:  Status post right total knee arthroplasty.  Adequate sizing, fixation and positioning of components.  Slight varus alignment.  No signs of postoperative complications.    No new imaging studies were obtained today.         Assessment and Plan:   Assessment:  65-year-old male with chronic right knee pain most likely caused by the medial compartmental end-stage osteoarthritic changes who underwent right total knee arthroplasty on 3/18/2021.  Recovering well.     Plan:  I discussed my findings with the patient.  He will continue his range of motion and strengthening exercises.  Patient understands and agrees to the treatment plan as set forth.  We will follow-up with him at his 1 year postoperative date with renewed radiographic imaging studies or sooner if there are any questions or concerns.    Jewel Marcial MD, PhD     Adult Reconstruction  [facility] Department of Orthopaedic Surgery  Pager [phone]    Total combined visit time and work time before and after clinic visit = 20 min

## 2021-06-19 ENCOUNTER — HEALTH MAINTENANCE LETTER (OUTPATIENT)
Age: 66
End: 2021-06-19

## 2021-10-09 ENCOUNTER — HEALTH MAINTENANCE LETTER (OUTPATIENT)
Age: 66
End: 2021-10-09

## 2022-01-23 ENCOUNTER — HEALTH MAINTENANCE LETTER (OUTPATIENT)
Age: 67
End: 2022-01-23

## 2022-02-16 NOTE — PROGRESS NOTES
"Discharge Note    Progress reporting period is from last SOAP note on  Apr 13, 2021.    Shankar failed to follow up and current status is unknown.  Please see information below for last relevant information on current status.  Patient seen for 2 visits.    SUBJECTIVE  Subjective changes noted by patient:  Pt experiencing periods of increased, intense pain shooting into medial lower leg. Has difficulty sleeping as the increased pain occurs every night. Still using a \"step to\" pattern up/down steps.   .  Current pain level is 3/10.     Previous pain level was  5/10.   Changes in function:  Yes (See Goal flowsheet attached for changes in current functional level)  Adverse reaction to treatment or activity: None    OBJECTIVE  Changes noted in objective findings: Amb into clinic with SEC. Improved AROM of R knee: 10-0-90. Pitting edema and discoloration   about anterior R lower leg. Ballotment R patella, due to ongoing casular edema.      ASSESSMENT/PLAN  Diagnosis: s/p R TKA   Updated problem list and treatment plan:   Pain - HEP  Decreased ROM/flexibility - HEP  Decreased function - HEP  STG/LTGs have been met or progress has been made towards goals:  Yes, please see goal flowsheet for most current information  Assessment of Progress: current status is unknown.    Last current status: Pt is progressing well   Self Management Plans:  HEP  I have re-evaluated this patient and find that the nature, scope, duration and intensity of the therapy is appropriate for the medical condition of the patient.  Shankar continues to require the following intervention to meet STG and LTG's:  HEP.    Recommendations:  Discharge with current home program.  Patient to follow up with MD as needed.    Please refer to the daily flowsheet for treatment today, total treatment time and time spent performing 1:1 timed codes.    "

## 2022-05-16 ENCOUNTER — HEALTH MAINTENANCE LETTER (OUTPATIENT)
Age: 67
End: 2022-05-16

## 2022-09-11 ENCOUNTER — HEALTH MAINTENANCE LETTER (OUTPATIENT)
Age: 67
End: 2022-09-11

## 2023-01-22 ENCOUNTER — HEALTH MAINTENANCE LETTER (OUTPATIENT)
Age: 68
End: 2023-01-22

## 2023-05-06 ENCOUNTER — HEALTH MAINTENANCE LETTER (OUTPATIENT)
Age: 68
End: 2023-05-06

## 2023-10-07 ENCOUNTER — HEALTH MAINTENANCE LETTER (OUTPATIENT)
Age: 68
End: 2023-10-07

## 2023-12-06 ENCOUNTER — APPOINTMENT (OUTPATIENT)
Dept: URBAN - METROPOLITAN AREA CLINIC 254 | Age: 68
Setting detail: DERMATOLOGY
End: 2023-12-06

## 2023-12-06 VITALS — HEIGHT: 60 IN | WEIGHT: 210 LBS

## 2023-12-06 DIAGNOSIS — L81.4 OTHER MELANIN HYPERPIGMENTATION: ICD-10-CM

## 2023-12-06 DIAGNOSIS — L82.1 OTHER SEBORRHEIC KERATOSIS: ICD-10-CM

## 2023-12-06 DIAGNOSIS — D49.2 NEOPLASM OF UNSPECIFIED BEHAVIOR OF BONE, SOFT TISSUE, AND SKIN: ICD-10-CM

## 2023-12-06 DIAGNOSIS — D22 MELANOCYTIC NEVI: ICD-10-CM

## 2023-12-06 DIAGNOSIS — L57.8 OTHER SKIN CHANGES DUE TO CHRONIC EXPOSURE TO NONIONIZING RADIATION: ICD-10-CM

## 2023-12-06 DIAGNOSIS — D18.0 HEMANGIOMA: ICD-10-CM

## 2023-12-06 DIAGNOSIS — Z71.89 OTHER SPECIFIED COUNSELING: ICD-10-CM

## 2023-12-06 DIAGNOSIS — L57.0 ACTINIC KERATOSIS: ICD-10-CM

## 2023-12-06 PROBLEM — D18.01 HEMANGIOMA OF SKIN AND SUBCUTANEOUS TISSUE: Status: ACTIVE | Noted: 2023-12-06

## 2023-12-06 PROBLEM — D22.5 MELANOCYTIC NEVI OF TRUNK: Status: ACTIVE | Noted: 2023-12-06

## 2023-12-06 PROCEDURE — OTHER COUNSELING: OTHER

## 2023-12-06 PROCEDURE — OTHER BIOPSY BY SHAVE METHOD: OTHER

## 2023-12-06 PROCEDURE — 11102 TANGNTL BX SKIN SINGLE LES: CPT

## 2023-12-06 PROCEDURE — OTHER LIQUID NITROGEN: OTHER

## 2023-12-06 PROCEDURE — 17000 DESTRUCT PREMALG LESION: CPT | Mod: 59

## 2023-12-06 PROCEDURE — OTHER MIPS QUALITY: OTHER

## 2023-12-06 PROCEDURE — 99203 OFFICE O/P NEW LOW 30 MIN: CPT | Mod: 25

## 2023-12-06 ASSESSMENT — LOCATION DETAILED DESCRIPTION DERM
LOCATION DETAILED: LEFT SUPERIOR LATERAL UPPER BACK
LOCATION DETAILED: LEFT SUPERIOR MEDIAL UPPER BACK
LOCATION DETAILED: LEFT MEDIAL UPPER BACK
LOCATION DETAILED: RIGHT SUPERIOR PARIETAL SCALP
LOCATION DETAILED: RIGHT INFERIOR UPPER BACK
LOCATION DETAILED: LEFT CENTRAL PARIETAL SCALP
LOCATION DETAILED: EPIGASTRIC SKIN
LOCATION DETAILED: LEFT SUPERIOR LATERAL MIDBACK
LOCATION DETAILED: MID-FRONTAL SCALP
LOCATION DETAILED: NASAL DORSUM

## 2023-12-06 ASSESSMENT — LOCATION SIMPLE DESCRIPTION DERM
LOCATION SIMPLE: SCALP
LOCATION SIMPLE: RIGHT UPPER BACK
LOCATION SIMPLE: LEFT LOWER BACK
LOCATION SIMPLE: NOSE
LOCATION SIMPLE: LEFT UPPER BACK
LOCATION SIMPLE: ANTERIOR SCALP
LOCATION SIMPLE: ABDOMEN

## 2023-12-06 ASSESSMENT — LOCATION ZONE DERM
LOCATION ZONE: SCALP
LOCATION ZONE: TRUNK
LOCATION ZONE: NOSE

## 2023-12-06 NOTE — PROCEDURE: MIPS QUALITY
Quality 47: Advance Care Plan: Advance Care Planning discussed and documented; advance care plan or surrogate decision maker documented in the medical record.
Detail Level: Detailed
Quality 110: Preventive Care And Screening: Influenza Immunization: Influenza Immunization previously received during influenza season
Quality 431: Preventive Care And Screening: Unhealthy Alcohol Use - Screening: Patient not identified as an unhealthy alcohol user when screened for unhealthy alcohol use using a systematic screening method
Quality 130: Documentation Of Current Medications In The Medical Record: Current Medications Documented
Quality 226: Preventive Care And Screening: Tobacco Use: Screening And Cessation Intervention: Patient screened for tobacco use and is an ex/non-smoker

## 2023-12-06 NOTE — PROCEDURE: LIQUID NITROGEN
Show Aperture Variable?: No
Render Post-Care Instructions In Note?: yes
Application Tool (Optional): Liquid Nitrogen Sprayer
Consent: - Discussed that these are a result of cumulative sun exposure.\\n- Consent was obtained and risks were reviewed prior to procedure today. All questions were answered prior to procedure today.\\n- Risks discussed include but are not limited to pain, crusting, scabbing, blistering, scarring, temporary or permanent darker or lighter pigmentary change, recurrence, incomplete resolution, and infection.
Duration Of Freeze Thaw-Cycle (Seconds): 0
Post-Care Instructions: - Patient was instructed to avoid picking at any of the treated lesions.
Detail Level: Detailed

## 2023-12-06 NOTE — HPI: FULL BODY SKIN EXAMINATION
How Severe Are Your Spot(S)?: mild
What Type Of Note Output Would You Prefer (Optional)?: Bullet Format
What Is The Reason For Today's Visit?: Full Body Skin Examination
What Is The Reason For Today's Visit? (Being Monitored For X): concerning skin lesions on an annual basis
Additional History: Patient does not present with any concerns and is here as a maintenance check up.

## 2023-12-06 NOTE — PROCEDURE: BIOPSY BY SHAVE METHOD
Bill For Surgical Tray: no
Detail Level: Detailed
Hide Topical Anesthesia?: Yes
Anesthesia Type: 1% lidocaine with epinephrine
Consent: - Consent was obtained and risks were reviewed prior to procedure today. All questions were answered prior to procedure today.\\n- Risks discussed include but are not limited to scarring, infection, bleeding, scabbing, incomplete removal, nerve damage, pain, and allergy to anesthesia.
Lab Facility: [id number]
Hemostasis: Drysol
Cryotherapy Text: The wound bed was treated with cryotherapy after the biopsy was performed.
Lab: -[id number]
Biopsy Method: Dermablade
Depth Of Biopsy: dermis
Electrodesiccation Text: The wound bed was treated with electrodesiccation after the biopsy was performed.
Wound Care: Aquaphor
Type Of Destruction Used: Curettage
Billing Type: Third-Party Bill
Anesthesia Volume In Cc: 0.3
Post-Care Instructions: WOUND CARE:\\nDo NOT submerge wound in a bath, swimming pool, or hot tub for at least 1 week or for as long as there is an open wound. Gently cleanse the site daily with mild soap and water. Be careful NOT to allow a forceful stream of water to hit the biopsy site. After cleaning/showering, apply a thin layer of petrolatum ointment or Aquaphor in the wound followed by an adhesive bandage. Continue this process daily until healed. \\n\\nBLEEDING:\\nIf you develop persistent bleeding, apply firm and steady pressure over the dressing with gauze for 15 minutes. If bleeding persists, reapply pressure with an ice pack over the gauze for 15 minutes. NEVER APPLY ICE DIRECTLY TO THE SKIN. Do NOT peek under the gauze during these 15 minutes of pressure.  Call our office at [phone] if you cannot get the bleeding to stop. \\n\\nINFECTION:\\nSigns of infection may include increasing rather than decreasing pain (after the first few days), increasing redness/swelling/heat, draining pus, pink/red streaks around the wound, and fever or chills.  Please call our office immediately at [phone] if infection is suspected. It is normal to have some mild redness on or around the wound edges; this will lighten day by day and will become less tender.\\n\\nPAIN:\\nPain is usually minimal, but if needed you may take acetaminophen (Tylenol) every four hours as needed. Applying an ice pack over the dressing for 15-20 minutes every 2-3 hours will relieve swelling, lessen pain, and help minimize bruising. NEVER APPLY ICE DIRECTLY TO THE SKIN. Avoid ibuprofen (Advil, Motrin) and naproxen (Aleve) for the first 48 hours as these can increase bleeding.\\n\\nSWELLING AND BRUISING:\\nSwelling and bruising are common and temporary, usually lasting 1 - 2 weeks. It is more common in areas treated around the eyes, hands, and feet. In the days following the procedure, swelling and bruising can be minimized by keeping the affected areas elevated when possible, reducing salty foods, and applying ice packs over the dressing for 15-20 minutes every 2-3 hours. NEVER APPLY ICE DIRECTLY TO THE SKIN.\\n\\nITCHING:\\nItchiness on and around the wound is very common and can last several days to weeks after surgery. Mild itch is normal as the wound is healing. \\n\\nNERVE CHANGES:\\nNumbness is usually temporary, but it may last for several weeks to months. You may also experience sharp pains at the wound sight as it heals.  Mild pain is normal and will gradually improve with time.\\n \\nNO SMOKING:\\nSmoking will delay the healing process. If you smoke, we recommend trying to quit or at minimum reduce how much you smoke following a procedure.
Biopsy Type: H and E
Electrodesiccation And Curettage Text: The wound bed was treated with electrodesiccation and curettage after the biopsy was performed.
Dressing: bandage
Information: Selecting Yes will display possible errors in your note based on the variables you have selected. This validation is only offered as a suggestion for you. PLEASE NOTE THAT THE VALIDATION TEXT WILL BE REMOVED WHEN YOU FINALIZE YOUR NOTE. IF YOU WANT TO FAX A PRELIMINARY NOTE YOU WILL NEED TO TOGGLE THIS TO 'NO' IF YOU DO NOT WANT IT IN YOUR FAXED NOTE.
Notification Instructions: - It can take up to 2 -3 weeks to get a biopsy result. \\n- Please refrain from calling to request results until after 2 weeks.
Curettage Text: The wound bed was treated with curettage after the biopsy was performed.
Silver Nitrate Text: The wound bed was treated with silver nitrate after the biopsy was performed.

## 2023-12-18 ENCOUNTER — APPOINTMENT (OUTPATIENT)
Dept: URBAN - METROPOLITAN AREA CLINIC 254 | Age: 68
Setting detail: DERMATOLOGY
End: 2023-12-18

## 2023-12-18 PROBLEM — C44.519 BASAL CELL CARCINOMA OF SKIN OF OTHER PART OF TRUNK: Status: ACTIVE | Noted: 2023-12-18

## 2023-12-18 PROCEDURE — 17261 DSTRJ MAL LES T/A/L .6-1.0CM: CPT

## 2023-12-18 PROCEDURE — OTHER MIPS QUALITY: OTHER

## 2023-12-18 PROCEDURE — OTHER CURETTAGE AND DESTRUCTION: OTHER

## 2023-12-18 PROCEDURE — OTHER COUNSELING: OTHER

## 2023-12-18 NOTE — PROCEDURE: CURETTAGE AND DESTRUCTION
Size Of Lesion In Cm: 0.7
Size Of Lesion After Curettage: 0.9
Post-Care Instructions: - Keep pressure bandage on for 24-48 hours. \\n- Should you develop any fevers, chills, bleeding, severe pain, contact the clinic immediately.\\n\\nWOUND CARE:\\nDo NOT submerge wound in a bath, swimming pool, or hot tub for at least 1 week or for as long as there is an open wound. Gently cleanse the site daily with mild soap and water. Be careful NOT to allow a forceful stream of water to hit the treatment site. After cleaning/showering, apply a thin layer of petrolatum ointment or Aquaphor in the wound followed by an adhesive bandage. Continue this process daily until healed.\\n\\nBLEEDING:\\nIf you develop persistent bleeding, apply firm and steady pressure over the dressing with gauze for 15 minutes. If bleeding persists, reapply pressure with an ice pack over the gauze for 15 minutes. NEVER APPLY ICE DIRECTLY TO THE SKIN. Do NOT peek under the gauze during these 15 minutes of pressure. Call our office at [phone] if you cannot get the bleeding to stop.\\n\\nINFECTION:\\nSigns of infection may include increasing rather than decreasing pain (after the first few days), increasing redness/swelling/heat, draining pus, pink/red streaks around the wound, and fever or chills. Please call our office immediately at [phone] if infection is suspected. It is normal to have some mild redness on or around the wound edges; this will lighten day by day and will become less tender.\\n\\nPAIN:\\nPain is usually minimal, but if needed you may take acetaminophen (Tylenol) every four hours as needed. Applying an ice pack over the dressing for 15-20 minutes every 2-3 hours will relieve swelling, lessen pain, and help minimize bruising. NEVER APPLY ICE DIRECTLY TO THE SKIN. Avoid ibuprofen (Advil, Motrin) and naproxen (Aleve) for the first 48 hours as these can increase bleeding.\\n\\nSWELLING AND BRUISING:\\nSwelling and bruising are common and temporary, usually lasting 1 - 2 weeks. It is more common in areas treated around the eyes, hands, and feet. In the days following the procedure, swelling and bruising can be minimized by keeping the affected areas elevated when possible, reducing salty foods, and applying ice packs over the dressing for 15-20 minutes every 2-3 hours. NEVER APPLY ICE DIRECTLY TO THE SKIN.\\n\\nITCHING:\\nItchiness on and around the wound is very common and can last several days to weeks after surgery. Mild itch is normal as the wound is healing.\\n\\nNERVE CHANGES:\\nNumbness is usually temporary, but it may last for several weeks to months. You may also experience sharp pains at the wound sight as it heals. Mild pain is normal and will gradually improve with time.\\n\\nNO SMOKING:\\nSmoking will delay the healing process. If you smoke, we recommend trying to quit or at minimum reduce how much you smoke following a procedure.
Additional Information: (Optional): - Petrolatum ointment followed by an adhesive bandage was applied.
Bill As A Line Item Or As Units: Line Item
Anesthesia Volume In Cc: 1
Hide Accession Number?: No
Render Post-Care Instructions In Note?: yes
Final Size Statement: The size of the lesion after curettage was
Anesthesia Type: 1% lidocaine with epinephrine
Number Of Curettages: 3
What Was Performed First?: Curettage
Cautery Type: electrodesiccation
Concentration (Mg/Ml Or Millions Of Plaque Forming Units/Cc): 0.01
Consent: - Verbal and written informed consent was obtained from the patient prior to the procedure today.\\n- The risks, benefits and alternatives to therapy were discussed in detail.\\n- Specifically, the risks of infection, scarring, bleeding, prolonged wound healing, nerve injury, incomplete removal, allergy to anesthesia and recurrence were addressed.\\n- Prior to the procedure, the treatment site was clearly identified and confirmed by the patient. All components of Universal Protocol/PAUSE Rule completed.
Detail Level: Detailed

## 2023-12-18 NOTE — PROCEDURE: COUNSELING
Patient Specific Counseling (Will Not Stick From Patient To Patient): - Basal Cell Carcinoma is the most common form of skin cancer. It is a very high cure rate and prognosis with removal is excellent.\\n- The most important recommendation to reduce the risk of future skin cancers is to wear sunscreen with SFP 30 or greater on any sun exposed areas every day. No sunscreen is effective for longer than 2 hours of sun exposure, so reapplication is key to sufficient protection.\\n- Other helpful tips are to wear a wide-brimmed hat and seek shade when possible.\\n- Return to clinic for re-evaluation should ANY lesion change in size, color, shape, texture, bleed, fail to heal, or become symptomatic.\\n- Otherwise, after a diagnosis with basal cell carcinoma, full body skin exams are generally recommended at baseline, then every 6 months for 5 years, then annually thereafter.\\n- Reviewed the pathology report with patient and discussed the recommendation for electrodessication and curettage.
Detail Level: Detailed

## 2024-02-24 ENCOUNTER — HEALTH MAINTENANCE LETTER (OUTPATIENT)
Age: 69
End: 2024-02-24

## (undated) DEVICE — GLOVE PROTEXIS POWDER FREE 8.0 ORTHOPEDIC 2D73ET80

## (undated) DEVICE — NDL COUNTER 20CT 31142493

## (undated) DEVICE — DRSG TEGADERM 4X10" 1627

## (undated) DEVICE — GOWN XLG DISP 9545

## (undated) DEVICE — LINEN BACK PACK 5440

## (undated) DEVICE — DRSG STERI STRIP 1/2X4" R1547

## (undated) DEVICE — SOL WATER IRRIG 1000ML BOTTLE 2F7114

## (undated) DEVICE — HOOD FLYTE W/PEELAWAY 408-800-100

## (undated) DEVICE — SUCTION IRR SYSTEM W/O TIP INTERPULSE HANDPIECE 0210-100-000

## (undated) DEVICE — BLADE SAW SAGITTAL STRK 18X90X1.27MM HD SYS 6 6118-127-090

## (undated) DEVICE — ESU PENCIL W/SMOKE EVAC NEPTUNE STRYKER 0703-046-000

## (undated) DEVICE — BONE CEMENT MIXEVAC HI VAC W/CARTRIDGE 0306-563-000

## (undated) DEVICE — BONE CLEANING TIP INTERPULSE  0210-010-000

## (undated) DEVICE — DRAPE U-DRAPE 1015NSD NON-STERILE

## (undated) DEVICE — PREP DURAPREP 26ML APL 8630

## (undated) DEVICE — EYE PREP BETADINE 5% SOLUTION 30ML 0065-0411-30

## (undated) DEVICE — GLOVE PROTEXIS BLUE W/NEU-THERA 8.0  2D73EB80

## (undated) DEVICE — SOL NACL 0.9% IRRIG 3000ML BAG 2B7477

## (undated) DEVICE — SU VICRYL 1 CT-1 CR 8X18" J741D

## (undated) DEVICE — LIGHT HANDLE X1 31140133

## (undated) DEVICE — LINEN TOWEL PACK X5 5464

## (undated) DEVICE — BASIN SET MAJOR

## (undated) DEVICE — PREP POVIDONE-IODINE 7.5% SCRUB 4OZ BOTTLE MDS093945

## (undated) DEVICE — SPONGE LAP 18X18" X8435

## (undated) DEVICE — GLOVE PROTEXIS W/NEU-THERA 7.5  2D73TE75

## (undated) DEVICE — SUCTION MANIFOLD NEPTUNE 2 SYS 4 PORT 0702-020-000

## (undated) DEVICE — SOL NACL 0.9% IRRIG 1000ML BOTTLE 2F7124

## (undated) DEVICE — DRSG TEGADERM ALGINATE AG 4X5" 90303

## (undated) DEVICE — Device

## (undated) DEVICE — SU VICRYL 2-0 CT-1 27" UND J259H

## (undated) DEVICE — PREP DURAPREP REMOVER 4OZ 8611

## (undated) DEVICE — ESU GROUND PAD ADULT W/CORD E7507

## (undated) DEVICE — STRAP KNEE/BODY 31143004

## (undated) DEVICE — PREP SKIN SCRUB TRAY 4461A

## (undated) DEVICE — DECANTER VIAL 2006S

## (undated) DEVICE — GOWN IMPERVIOUS SPECIALTY XLG/XLONG 32474

## (undated) DEVICE — SUCTION TIP YANKAUER STR K87

## (undated) DEVICE — BLADE SAW RECIP STRK 70X12.5X1.2MM 0277-096-281

## (undated) DEVICE — SU PDS II 3-0 PS-1 18" Z683G

## (undated) RX ORDER — LIDOCAINE HYDROCHLORIDE 20 MG/ML
INJECTION, SOLUTION EPIDURAL; INFILTRATION; INTRACAUDAL; PERINEURAL
Status: DISPENSED
Start: 2021-03-18

## (undated) RX ORDER — CEFAZOLIN SODIUM 2 G/100ML
INJECTION, SOLUTION INTRAVENOUS
Status: DISPENSED
Start: 2021-03-18

## (undated) RX ORDER — FENTANYL CITRATE-0.9 % NACL/PF 10 MCG/ML
PLASTIC BAG, INJECTION (ML) INTRAVENOUS
Status: DISPENSED
Start: 2021-03-18

## (undated) RX ORDER — ONDANSETRON 2 MG/ML
INJECTION INTRAMUSCULAR; INTRAVENOUS
Status: DISPENSED
Start: 2021-03-18

## (undated) RX ORDER — DEXAMETHASONE SODIUM PHOSPHATE 4 MG/ML
INJECTION, SOLUTION INTRA-ARTICULAR; INTRALESIONAL; INTRAMUSCULAR; INTRAVENOUS; SOFT TISSUE
Status: DISPENSED
Start: 2021-03-18

## (undated) RX ORDER — TRANEXAMIC ACID 650 MG/1
TABLET ORAL
Status: DISPENSED
Start: 2021-03-18

## (undated) RX ORDER — PROPOFOL 10 MG/ML
INJECTION, EMULSION INTRAVENOUS
Status: DISPENSED
Start: 2021-03-18

## (undated) RX ORDER — EPHEDRINE SULFATE 50 MG/ML
INJECTION, SOLUTION INTRAMUSCULAR; INTRAVENOUS; SUBCUTANEOUS
Status: DISPENSED
Start: 2021-03-18

## (undated) RX ORDER — SCOLOPAMINE TRANSDERMAL SYSTEM 1 MG/1
PATCH, EXTENDED RELEASE TRANSDERMAL
Status: DISPENSED
Start: 2021-03-18

## (undated) RX ORDER — FENTANYL CITRATE 50 UG/ML
INJECTION, SOLUTION INTRAMUSCULAR; INTRAVENOUS
Status: DISPENSED
Start: 2021-03-18